# Patient Record
Sex: FEMALE | Race: WHITE | NOT HISPANIC OR LATINO | Employment: OTHER | URBAN - METROPOLITAN AREA
[De-identification: names, ages, dates, MRNs, and addresses within clinical notes are randomized per-mention and may not be internally consistent; named-entity substitution may affect disease eponyms.]

---

## 2019-06-18 ENCOUNTER — HISTORICAL (OUTPATIENT)
Dept: ADMINISTRATIVE | Facility: HOSPITAL | Age: 69
End: 2019-06-18

## 2019-11-08 ENCOUNTER — HISTORICAL (OUTPATIENT)
Dept: ADMINISTRATIVE | Facility: HOSPITAL | Age: 69
End: 2019-11-08

## 2019-12-06 ENCOUNTER — HISTORICAL (OUTPATIENT)
Dept: ADMINISTRATIVE | Facility: HOSPITAL | Age: 69
End: 2019-12-06

## 2019-12-07 LAB — GRAM STN SPEC: NORMAL

## 2019-12-09 ENCOUNTER — HISTORICAL (OUTPATIENT)
Dept: ADMINISTRATIVE | Facility: HOSPITAL | Age: 69
End: 2019-12-09

## 2019-12-09 LAB — FINAL CULTURE: NORMAL

## 2019-12-11 LAB — FINAL CULTURE: NORMAL

## 2020-01-14 ENCOUNTER — HISTORICAL (OUTPATIENT)
Dept: ADMINISTRATIVE | Facility: HOSPITAL | Age: 70
End: 2020-01-14

## 2020-01-15 LAB — GRAM STN SPEC: NORMAL

## 2020-01-17 LAB — FINAL CULTURE: NORMAL

## 2020-01-19 LAB — FINAL CULTURE: NORMAL

## 2020-02-04 ENCOUNTER — HISTORICAL (OUTPATIENT)
Dept: ADMINISTRATIVE | Facility: HOSPITAL | Age: 70
End: 2020-02-04

## 2022-04-30 NOTE — OP NOTE
DATE OF SURGERY:        SURGEON:  Yared Salmon MD    PREOPERATIVE DIAGNOSIS:  Acute endophthalmitis to the left eye.    POSTOPERATIVE DIAGNOSIS:  Acute endophthalmitis to the left eye.    PROCEDURE:  Vitreous biopsy and injection of antibiotics and steroids to the left eye.    ANESTHESIA:  General.    ESTIMATED BLOOD LOSS:  Less than 5 cc.    COMPLICATIONS:  None.    PROCEDURE INDICATIONS:  Ms. Lacy has a history of glaucoma surgery with a glaucoma tube shunt and has had to have a wound revision because of an exposed tube.  In the postoperative period, she has developed pain, decreasing vision, and fibrin in the anterior chamber indicative of an acute postoperative endophthalmitis.  She requires a vitreous sampling and injection of antibiotics. This is done in lieu of a vitrectomy as she has a very cloudy cornea and a very poor view of the retina.    DESCRIPTION OF PROCEDURE:  The patient was taken to the operative theater where general anesthesia was begun.  The left eye was prepped and draped in the normal sterile fashion and a lid speculum was applied.  A 3 cc syringe with a 25-gauge needle was used to perform a vitreous biopsy through the nasal pars plana 3.5 mm from the surgical limbus. 0.4 cc of vitreous sample was removed and sent for Gram stain and culture.  This was followed by 3 separate individual intravitreal injections.  The 1st was 4 mg of Decadron in 0.1 cc of fluid, followed by 2.25 mg of ceftazidime in 0.1 cc of fluid, followed by 1.0 mg of vancomycin in 0.1 cc of fluid.  Each were given through the nasal pars plana 3.5 mm from the surgical limbus with separate tuberculin syringes and separate 30-gauge needles. 0.4 cc of each antibiotic was then given in the subconjunctival space inferiorly.  All instruments were removed from the eye and several drops of TobraDex ophthalmic solution were applied to the eye.  The lid speculum and eye drape were then removed and the eye was covered with a  gauze patch.  The patient was transported to the postoperative care unit to recover.    DISCHARGE CONDITION:  Good.      DISPOSITION:  Home with followup with Dr. Mcgrath the following day.  This patient tolerated the procedure well.        ______________________________  MD YANY Bustamante/SK  DD:  12/06/2019  Time:  02:21PM  DT:  12/06/2019  Time:  02:34PM  Job #:  [id number]

## 2022-04-30 NOTE — OP NOTE
DATE OF SURGERY:        SURGEON:  Yared Salmon MD    PREOPERATIVE DIAGNOSIS:  Acute endophthalmitis to the left eye.    POSTOPERATIVE DIAGNOSIS:  Acute endophthalmitis to the left eye.    PROCEDURES:  Pars plana vitrectomy with injection of antibiotics to the left eye.    ANESTHESIA:  General.    ESTIMATED BLOOD LOSS:  Less than 5 cc.    COMPLICATIONS:  None.    PROCEDURE INDICATIONS:  Ms. Lacy has a history of acute endophthalmitis to the left eye with a history of a glaucoma tube shunt and a wound revision.  Three days ago, she had a vitreous tap and injection of vancomycin and ceftazidime.  There was no growth at 72 hours.  However, the antibiotics half life is 72 hours and she still has fibrin in the anterior chamber, vitreitis and hand motions vision, so she requires a vitrectomy with repeat injection of antibiotics.    DESCRIPTION OF PROCEDURE:  The patient was taken to the operative theater where general anesthesia was begun.  The left eye was prepped and draped in the normal sterile fashion and a lid speculum was applied.  An MVR blade was used to create a corneal paracentesis, and the anterior chamber was inflated with Healon improving the view slightly.  The patient still had corneal edema and Descemet's folds however.       Three 25-gauge trocars were placed in the globe, 3.5 mm from the surgical limbus.  An infusion cannula was placed, as well as the light pipe and a vitrectomy cutter.  The vitrectomy cutter could be visualized in mid vitreous and a central floor vitrectomy was performed.  As the vitreous was removed, the posterior pole view slightly improved.  The corneal epithelium was removed using a Koi blade from the pupillary margins allowing a better view of the retina.       The vitreous was removed down to the retina.  It was apparent that there was no posterior vitreous detachment and there was fibrinoid posterior hyaloid over the macular surface.  A cursory attempt to create a  posterior vitreous detachment was attempted.  However, this was not working and to create a posterior vitreous detachment was going to require a closer approach to the retinal surface with the vitrectomy cutter.     As the view was poor, it was decided to leave the posterior hyaloid attached.  The vitrectomy cutter was then used to remove the Healon from the anterior chamber and hopefully any fibrin, although the view of the anterior chamber was still cloudy from the cornea.  All instruments were removed from the eye.       Intravitreal injections of 0.1 cc of vancomycin in a concentration of 1.0 mg per 0.1 cc, followed by 2.25 mg of ceftazidime in 0.1 cc and 4 mg of Decadron in 0.1 cc were all given intravitreally through separate tuberculin syringes without complication.  0.5 cc of each fluid was also given in the subconjunctival space.       Several drops of TobraDex ophthalmic solution were applied to the eye.  The postoperative intraocular pressure was 15 mmHg.  The lid speculum and eye drape were then removed, and the eye was covered with a gauze patch and a Quijano shield.  The patient was then transported to the postoperative care unit to recover.    DISCHARGE CONDITION:  Good.    DISPOSITION:  Home with followup with Dr. Salmon the following day.  This patient tolerated the procedure well.        ______________________________  MD YANY Bustamante/UM  DD:  12/09/2019  Time:  11:01AM  DT:  12/09/2019  Time:  11:18AM  Job #:  [id number]

## 2022-04-30 NOTE — OP NOTE
DATE OF SURGERY:  6-18-19    SURGEON:  Lyssa Mcgrath MD    PREOPERATIVE DIAGNOSIS:  Open angle glaucoma left eye; visually significant cataract left eye    POSTOPERATIVE DIAGNOSIS:  same status post procedure    PROCEDURE:  Ahmed placement in the left eye with scleral patch graft reinforcement of the sclera left eye.; phacoemulsification left eye with intraocular lens    ANESTHESIA:  MAC plus local.   COMPLICATIONS:  None.   ESTIMATED BLOOD LOSS:  Less than 1 cc.    INDICATIONS:  The patient was evaluated in clinic and noted to have an elevated intraocular pressure on maximum medical therapy.    The advantages, risks and benefits of surgical intervention were discussed with the patient including but not limited to bleeding, infection, decreased vision, loss of the eye and need for subsequent surgery as well as worsening of cataract and diplopia.  The patient acknowledged understanding and wished to proceed.  Informed consent was obtained from the patient in clinic.    PROCEDURE IN DETAIL:  The patient was brought to the operating room and laid supine postion. MAC anesthesia undertaken without complication.  The operative eye and periorbital region were prepped and draped in the usual manner for ocular surgery.  A paracentesis incision was made at approximately 4 o'clock with a clear cornea at the limbus. Preservative free Lidocaine and epinephrine was injected into the anterior chamber followed by viscoelastic and vision blue.  A triplanar cataract wound was then created approximately 2 o'clock through clear cornea at the limbus.  Curvilinear capsulorrhexis was created without complication.     BSS sonic cannula was used to hydrodissect the lens.  The lens was found to move freely within the capsular bag.  Lens was then removed in divide and conquer technique.  Remaining cortical material was removed with I&A handpiece.  A 20.0 diopter ZCBOO lens was then implanted into the capsular bag. The remaining  viscoelastic was then removed with the irrigation aspiration handpiece. The wounds were hydrated and a 10.0 nylon placed at the wound.  A 7-0 Vicryl traction suture was placed in the cornea superiorly and the eye was moved inferiorly for access to the surgical site.  A small peritomy was made at 12 o'clock and injection of preservative free Lidocaine/Epinephrine mixture was injected into sub-Tenon's space for further anesthesia as well as dissection.  The peritomy was then continued for two clock hours using Medhat scissors and undermined.  Wing relaxing incisions were made temporally.  The sub-Tenon space was expanded with blunt tipped Medhat scissors.  Hemostasis was maintained using bipolar cautery.  The Ahmed valve was primed with BSS and then placed into sub-Tenon space  A caliper was used to position the valve precisely 8 mm posterior to the limbus in superotemporal position.  Two 10-0 nylon sutures were used to secure it to the sclera.  The tube was cut 3 mm long for insertion to the anterior chamber.  23 gauge needle was used to make the incision to the anterior chamber parallel to the iris.  The tube was inserted and noted to be in position in front of the iris and away from the cornea.  The tube was secured to the sclera using a 10-0 nylon suture.  The tube was covered with scleral patch graft which was secured to the sclerae using 10-0 nylon suture .  The conjunctiva was then closed using interrupted and mattress 10-0 nylon sutures.  The IOP was judged to be physiologic.  The tube remains in good position in the anterior chamber.  Postop injections of Ancef and Dexamethasone were injected  subconjunctivally.  The lid speculum was removed.  Maxitrol ointment was placed on the operative eye followed by pressure patch and shield.  The patient left the operating room in stable condition having tolerated the procedure well.

## 2023-07-27 DIAGNOSIS — Z95.2 S/P AVR: Primary | ICD-10-CM

## 2023-08-10 ENCOUNTER — OFFICE VISIT (OUTPATIENT)
Dept: CARDIAC SURGERY | Facility: CLINIC | Age: 73
End: 2023-08-10
Payer: MEDICARE

## 2023-08-10 ENCOUNTER — LAB VISIT (OUTPATIENT)
Dept: LAB | Facility: HOSPITAL | Age: 73
End: 2023-08-10
Attending: THORACIC SURGERY (CARDIOTHORACIC VASCULAR SURGERY)
Payer: MEDICARE

## 2023-08-10 VITALS
DIASTOLIC BLOOD PRESSURE: 69 MMHG | HEIGHT: 66 IN | HEART RATE: 78 BPM | RESPIRATION RATE: 18 BRPM | WEIGHT: 205 LBS | BODY MASS INDEX: 32.95 KG/M2 | SYSTOLIC BLOOD PRESSURE: 110 MMHG | OXYGEN SATURATION: 98 %

## 2023-08-10 DIAGNOSIS — Z79.4 DIABETES MELLITUS DUE TO UNDERLYING CONDITION WITH DIABETIC NEPHROPATHY, WITH LONG-TERM CURRENT USE OF INSULIN: ICD-10-CM

## 2023-08-10 DIAGNOSIS — Z95.2 S/P AVR: ICD-10-CM

## 2023-08-10 DIAGNOSIS — E08.21 DIABETES MELLITUS DUE TO UNDERLYING CONDITION WITH DIABETIC NEPHROPATHY, WITH LONG-TERM CURRENT USE OF INSULIN: ICD-10-CM

## 2023-08-10 DIAGNOSIS — I35.0 NONRHEUMATIC AORTIC VALVE STENOSIS: ICD-10-CM

## 2023-08-10 DIAGNOSIS — I35.0 NONRHEUMATIC AORTIC VALVE STENOSIS: Primary | ICD-10-CM

## 2023-08-10 LAB
ALBUMIN SERPL-MCNC: 3.8 G/DL (ref 3.4–4.8)
ALBUMIN/GLOB SERPL: 1.7 RATIO (ref 1.1–2)
ALP SERPL-CCNC: 60 UNIT/L (ref 40–150)
ALT SERPL-CCNC: 11 UNIT/L (ref 0–55)
APPEARANCE UR: CLEAR
APTT PPP: 39.5 SECONDS (ref 23.2–33.7)
AST SERPL-CCNC: 18 UNIT/L (ref 5–34)
BACTERIA #/AREA URNS AUTO: ABNORMAL /HPF
BASOPHILS # BLD AUTO: 0.07 X10(3)/MCL
BASOPHILS NFR BLD AUTO: 1 %
BILIRUB SERPL-MCNC: 0.6 MG/DL
BILIRUB UR QL STRIP.AUTO: NEGATIVE
BUN SERPL-MCNC: 46.4 MG/DL (ref 9.8–20.1)
CALCIUM SERPL-MCNC: 8.9 MG/DL (ref 8.4–10.2)
CHLORIDE SERPL-SCNC: 112 MMOL/L (ref 98–107)
CO2 SERPL-SCNC: 22 MMOL/L (ref 23–31)
COLOR UR: YELLOW
CREAT SERPL-MCNC: 1.39 MG/DL (ref 0.55–1.02)
EOSINOPHIL # BLD AUTO: 0.13 X10(3)/MCL (ref 0–0.9)
EOSINOPHIL NFR BLD AUTO: 1.8 %
ERYTHROCYTE [DISTWIDTH] IN BLOOD BY AUTOMATED COUNT: 14.7 % (ref 11.5–17)
GFR SERPLBLD CREATININE-BSD FMLA CKD-EPI: 40 MLS/MIN/1.73/M2
GLOBULIN SER-MCNC: 2.3 GM/DL (ref 2.4–3.5)
GLUCOSE SERPL-MCNC: 197 MG/DL (ref 82–115)
GLUCOSE UR QL STRIP.AUTO: ABNORMAL
GROUP & RH: NORMAL
HCT VFR BLD AUTO: 41.2 % (ref 37–47)
HGB BLD-MCNC: 12.2 G/DL (ref 12–16)
IMM GRANULOCYTES # BLD AUTO: 0.06 X10(3)/MCL (ref 0–0.04)
IMM GRANULOCYTES NFR BLD AUTO: 0.8 %
INDIRECT COOMBS GEL: NORMAL
KETONES UR QL STRIP.AUTO: NEGATIVE
LEUKOCYTE ESTERASE UR QL STRIP.AUTO: ABNORMAL
LYMPHOCYTES # BLD AUTO: 1.46 X10(3)/MCL (ref 0.6–4.6)
LYMPHOCYTES NFR BLD AUTO: 20.7 %
MCH RBC QN AUTO: 28.4 PG (ref 27–31)
MCHC RBC AUTO-ENTMCNC: 29.6 G/DL (ref 33–36)
MCV RBC AUTO: 96 FL (ref 80–94)
MONOCYTES # BLD AUTO: 0.67 X10(3)/MCL (ref 0.1–1.3)
MONOCYTES NFR BLD AUTO: 9.5 %
NEUTROPHILS # BLD AUTO: 4.68 X10(3)/MCL (ref 2.1–9.2)
NEUTROPHILS NFR BLD AUTO: 66.2 %
NITRITE UR QL STRIP.AUTO: POSITIVE
NRBC BLD AUTO-RTO: 0 %
PH UR STRIP.AUTO: 5.5 [PH]
PLATELET # BLD AUTO: 166 X10(3)/MCL (ref 130–400)
PMV BLD AUTO: 9.8 FL (ref 7.4–10.4)
POTASSIUM SERPL-SCNC: 5.2 MMOL/L (ref 3.5–5.1)
PROT SERPL-MCNC: 6.1 GM/DL (ref 5.8–7.6)
PROT UR QL STRIP.AUTO: NEGATIVE
RBC # BLD AUTO: 4.29 X10(6)/MCL (ref 4.2–5.4)
RBC #/AREA URNS AUTO: <5 /HPF
RBC UR QL AUTO: NEGATIVE
SODIUM SERPL-SCNC: 141 MMOL/L (ref 136–145)
SP GR UR STRIP.AUTO: 1.02 (ref 1–1.03)
SPECIMEN OUTDATE: NORMAL
SQUAMOUS #/AREA URNS AUTO: <5 /HPF
UROBILINOGEN UR STRIP-ACNC: 0.2
WBC # SPEC AUTO: 7.07 X10(3)/MCL (ref 4.5–11.5)
WBC #/AREA URNS AUTO: 7 /HPF

## 2023-08-10 PROCEDURE — 86900 BLOOD TYPING SEROLOGIC ABO: CPT | Performed by: THORACIC SURGERY (CARDIOTHORACIC VASCULAR SURGERY)

## 2023-08-10 PROCEDURE — 3074F SYST BP LT 130 MM HG: CPT | Mod: CPTII,,, | Performed by: THORACIC SURGERY (CARDIOTHORACIC VASCULAR SURGERY)

## 2023-08-10 PROCEDURE — 1101F PR PT FALLS ASSESS DOC 0-1 FALLS W/OUT INJ PAST YR: ICD-10-PCS | Mod: CPTII,,, | Performed by: THORACIC SURGERY (CARDIOTHORACIC VASCULAR SURGERY)

## 2023-08-10 PROCEDURE — 4010F PR ACE/ARB THEARPY RXD/TAKEN: ICD-10-PCS | Mod: CPTII,,, | Performed by: THORACIC SURGERY (CARDIOTHORACIC VASCULAR SURGERY)

## 2023-08-10 PROCEDURE — 93005 ELECTROCARDIOGRAM TRACING: CPT

## 2023-08-10 PROCEDURE — 81001 URINALYSIS AUTO W/SCOPE: CPT

## 2023-08-10 PROCEDURE — 3288F PR FALLS RISK ASSESSMENT DOCUMENTED: ICD-10-PCS | Mod: CPTII,,, | Performed by: THORACIC SURGERY (CARDIOTHORACIC VASCULAR SURGERY)

## 2023-08-10 PROCEDURE — 85025 COMPLETE CBC W/AUTO DIFF WBC: CPT

## 2023-08-10 PROCEDURE — 3074F PR MOST RECENT SYSTOLIC BLOOD PRESSURE < 130 MM HG: ICD-10-PCS | Mod: CPTII,,, | Performed by: THORACIC SURGERY (CARDIOTHORACIC VASCULAR SURGERY)

## 2023-08-10 PROCEDURE — 85730 THROMBOPLASTIN TIME PARTIAL: CPT

## 2023-08-10 PROCEDURE — 3288F FALL RISK ASSESSMENT DOCD: CPT | Mod: CPTII,,, | Performed by: THORACIC SURGERY (CARDIOTHORACIC VASCULAR SURGERY)

## 2023-08-10 PROCEDURE — 80053 COMPREHEN METABOLIC PANEL: CPT

## 2023-08-10 PROCEDURE — 99203 OFFICE O/P NEW LOW 30 MIN: CPT | Mod: ,,, | Performed by: THORACIC SURGERY (CARDIOTHORACIC VASCULAR SURGERY)

## 2023-08-10 PROCEDURE — 36415 COLL VENOUS BLD VENIPUNCTURE: CPT

## 2023-08-10 PROCEDURE — 1101F PT FALLS ASSESS-DOCD LE1/YR: CPT | Mod: CPTII,,, | Performed by: THORACIC SURGERY (CARDIOTHORACIC VASCULAR SURGERY)

## 2023-08-10 PROCEDURE — 4010F ACE/ARB THERAPY RXD/TAKEN: CPT | Mod: CPTII,,, | Performed by: THORACIC SURGERY (CARDIOTHORACIC VASCULAR SURGERY)

## 2023-08-10 PROCEDURE — 1159F MED LIST DOCD IN RCRD: CPT | Mod: CPTII,,, | Performed by: THORACIC SURGERY (CARDIOTHORACIC VASCULAR SURGERY)

## 2023-08-10 PROCEDURE — 99203 PR OFFICE/OUTPT VISIT, NEW, LEVL III, 30-44 MIN: ICD-10-PCS | Mod: ,,, | Performed by: THORACIC SURGERY (CARDIOTHORACIC VASCULAR SURGERY)

## 2023-08-10 PROCEDURE — 3078F PR MOST RECENT DIASTOLIC BLOOD PRESSURE < 80 MM HG: ICD-10-PCS | Mod: CPTII,,, | Performed by: THORACIC SURGERY (CARDIOTHORACIC VASCULAR SURGERY)

## 2023-08-10 PROCEDURE — 3078F DIAST BP <80 MM HG: CPT | Mod: CPTII,,, | Performed by: THORACIC SURGERY (CARDIOTHORACIC VASCULAR SURGERY)

## 2023-08-10 PROCEDURE — 1159F PR MEDICATION LIST DOCUMENTED IN MEDICAL RECORD: ICD-10-PCS | Mod: CPTII,,, | Performed by: THORACIC SURGERY (CARDIOTHORACIC VASCULAR SURGERY)

## 2023-08-10 RX ORDER — FLUTICASONE PROPIONATE 50 MCG
SPRAY, SUSPENSION (ML) NASAL
COMMUNITY
Start: 2023-05-05

## 2023-08-10 RX ORDER — CHOLESTYRAMINE 4 G/9G
4 POWDER, FOR SUSPENSION ORAL
COMMUNITY
Start: 2023-07-14

## 2023-08-10 RX ORDER — FUROSEMIDE 40 MG/1
40 TABLET ORAL DAILY
COMMUNITY
Start: 2023-08-08

## 2023-08-10 RX ORDER — ROPINIROLE 1 MG/1
1 TABLET, FILM COATED ORAL NIGHTLY
COMMUNITY
Start: 2023-07-14

## 2023-08-10 RX ORDER — AZITHROMYCIN 250 MG/1
250 TABLET, FILM COATED ORAL
Status: ON HOLD | COMMUNITY
Start: 2023-02-28 | End: 2023-08-31 | Stop reason: HOSPADM

## 2023-08-10 RX ORDER — AZELASTINE 1 MG/ML
SPRAY, METERED NASAL
COMMUNITY
Start: 2023-05-05

## 2023-08-10 RX ORDER — HYDROXYZINE HYDROCHLORIDE 25 MG/1
25 TABLET, FILM COATED ORAL NIGHTLY
COMMUNITY
Start: 2023-07-14

## 2023-08-10 RX ORDER — DILTIAZEM HYDROCHLORIDE 120 MG/1
120 CAPSULE, COATED, EXTENDED RELEASE ORAL DAILY
Status: ON HOLD | COMMUNITY
Start: 2023-07-05 | End: 2023-08-31 | Stop reason: HOSPADM

## 2023-08-10 RX ORDER — EMPAGLIFLOZIN 25 MG/1
25 TABLET, FILM COATED ORAL DAILY
COMMUNITY
Start: 2023-07-14

## 2023-08-10 RX ORDER — INSULIN GLARGINE 100 [IU]/ML
30 INJECTION, SOLUTION SUBCUTANEOUS NIGHTLY
COMMUNITY
Start: 2023-07-14

## 2023-08-10 RX ORDER — CLOPIDOGREL BISULFATE 75 MG/1
75 TABLET ORAL DAILY
COMMUNITY
Start: 2023-07-14

## 2023-08-10 RX ORDER — LOSARTAN POTASSIUM 50 MG/1
50 TABLET ORAL DAILY
COMMUNITY
Start: 2023-06-02

## 2023-08-10 RX ORDER — ERYTHROMYCIN 5 MG/G
OINTMENT OPHTHALMIC
COMMUNITY
Start: 2023-06-28

## 2023-08-10 RX ORDER — METOPROLOL SUCCINATE 50 MG/1
50 TABLET, EXTENDED RELEASE ORAL DAILY
COMMUNITY
Start: 2023-08-08

## 2023-08-10 RX ORDER — PANTOPRAZOLE SODIUM 40 MG/1
40 TABLET, DELAYED RELEASE ORAL DAILY
COMMUNITY
Start: 2023-07-14

## 2023-08-10 RX ORDER — GABAPENTIN 300 MG/1
300 CAPSULE ORAL 3 TIMES DAILY
COMMUNITY
Start: 2023-07-14

## 2023-08-10 RX ORDER — BRIMONIDINE TARTRATE 2 MG/ML
1 SOLUTION/ DROPS OPHTHALMIC DAILY PRN
COMMUNITY
Start: 2023-03-16

## 2023-08-10 RX ORDER — APIXABAN 5 MG/1
5 TABLET, FILM COATED ORAL 2 TIMES DAILY
COMMUNITY
Start: 2023-07-14

## 2023-08-10 RX ORDER — PEN NEEDLE, DIABETIC 31 GX3/16"
NEEDLE, DISPOSABLE MISCELLANEOUS
COMMUNITY
Start: 2023-05-05

## 2023-08-10 RX ORDER — LANOLIN ALCOHOL/MO/W.PET/CERES
400 CREAM (GRAM) TOPICAL DAILY
COMMUNITY

## 2023-08-10 RX ORDER — METOPROLOL SUCCINATE 25 MG/1
25 TABLET, EXTENDED RELEASE ORAL
Status: ON HOLD | COMMUNITY
Start: 2023-07-14 | End: 2023-08-31 | Stop reason: HOSPADM

## 2023-08-10 RX ORDER — ATORVASTATIN CALCIUM 40 MG/1
40 TABLET, FILM COATED ORAL DAILY
COMMUNITY
Start: 2023-07-14

## 2023-08-10 RX ORDER — METHAZOLAMIDE 25 MG/1
50 TABLET ORAL 2 TIMES DAILY
COMMUNITY
Start: 2023-07-07

## 2023-08-10 NOTE — PROGRESS NOTES
History and Physical      Subjective:      Patient ID: Bria Lacy is a 73 y.o. female.    Chief Complaint: Aortic Stenosis (Referral [facility]-TAVR-scheduled 8/28/23)      HPI:  This is a 73-year-old female diabetic who was recently admitted with an episode of acute systolic congestive heart failure and atrial fibrillation with rapid ventricular response she was treated aggressively with rate control and aggressive diuresis.  She was diagnosed at that time with critical aortic stenosis.  Her STS risk assessment is 2.3%.  The ejection fraction is 60%.  The aortic valve area is 0.6 cm with a mean gradient of 40 and a peak velocity of 4.1 m/sec.  The patient would be a very good candidate for transcatheter solution.  The procedure, risks, and complications were discussed in detail and plain language.  Included in the discussion was the risk of bleeding, infection, myocardial infarction, stroke, and the potential for pacemaker placement and open conversion.  The patient voiced understanding and is willing to proceed.  Stop Eliquis 4 days prior to surgery      Current Outpatient Medications:     atorvastatin (LIPITOR) 40 MG tablet, Take 40 mg by mouth., Disp: , Rfl:     azelastine (ASTELIN) 137 mcg (0.1 %) nasal spray, SMARTSIG:Both Nares, Disp: , Rfl:     azithromycin (Z-JUDY) 250 MG tablet, Take 250 mg by mouth., Disp: , Rfl:     brimonidine 0.2% (ALPHAGAN) 0.2 % Drop, Place into both eyes., Disp: , Rfl:     cholestyramine (QUESTRAN) 4 gram packet, Take by mouth., Disp: , Rfl:     clopidogreL (PLAVIX) 75 mg tablet, Take 75 mg by mouth., Disp: , Rfl:     diltiaZEM (CARDIZEM CD) 120 MG Cp24, Take 120 mg by mouth., Disp: , Rfl:     ELIQUIS 5 mg Tab, Take 5 mg by mouth 2 (two) times daily., Disp: , Rfl:     erythromycin (ROMYCIN) ophthalmic ointment, Place into both eyes., Disp: , Rfl:     fluticasone propionate (FLONASE) 50 mcg/actuation nasal spray, by Each Nostril route., Disp: , Rfl:     furosemide (LASIX) 40 MG tablet,  "Take 40 mg by mouth., Disp: , Rfl:     gabapentin (NEURONTIN) 300 MG capsule, Take by mouth., Disp: , Rfl:     hydrOXYzine HCL (ATARAX) 25 MG tablet, Take 25 mg by mouth., Disp: , Rfl:     JARDIANCE 25 mg tablet, Take 25 mg by mouth., Disp: , Rfl:     LANTUS SOLOSTAR U-100 INSULIN glargine 100 units/mL SubQ pen, Inject into the skin., Disp: , Rfl:     losartan (COZAAR) 50 MG tablet, Take 50 mg by mouth., Disp: , Rfl:     methazoLAMIDE (NEPTAZANE) 25 mg tablet, Take by mouth 2 (two) times daily., Disp: , Rfl:     metoprolol succinate (TOPROL-XL) 25 MG 24 hr tablet, Take 25 mg by mouth., Disp: , Rfl:     metoprolol succinate (TOPROL-XL) 50 MG 24 hr tablet, Take 50 mg by mouth., Disp: , Rfl:     pantoprazole (PROTONIX) 40 MG tablet, Take 40 mg by mouth., Disp: , Rfl:     rOPINIRole (REQUIP) 1 MG tablet, Take 1 mg by mouth every evening., Disp: , Rfl:     SURE COMFORT PEN NEEDLE 31 gauge x 5/16" Ndle, , Disp: , Rfl:   Review of patient's allergies indicates:  No Known Allergies    /69 (BP Location: Left arm)   Pulse 78   Resp 18   Ht 5' 6" (1.676 m)   Wt 93 kg (205 lb)   SpO2 98%   BMI 33.09 kg/m²     Review of Systems   Constitutional: Negative.   HENT: Negative.    Eyes: Negative.    Cardiovascular: Negative.    Respiratory: Negative.    Endocrine: Negative.    Hematologic/Lymphatic: Negative.    Skin: Negative.    Musculoskeletal: Negative.    Gastrointestinal: Negative.    Genitourinary: Negative.    Neurological: Negative.    Psychiatric/Behavioral: Negative.    Allergic/Immunologic: Negative.        Objective:     Constitutional:  No acute distress  HENT:  Supple without mass.  Trachea midline.  No carotid bruits  Eyes:  Blind in the left eye  Cardiovascular:  Irregularly irregular.  There is a grade 2 systolic ejection murmur  Respiratory:  Clear to auscultation  Endocrine:  Hematologic/Lymphatic:   Skin:  Warm and dry  Musculoskeletal:  No cyanosis clubbing or edema  Gastrointestinal:  Soft and " "nontender  Genitourinary:   Neurological:  Normal  Psychiatric/Behavioral:   Extremities:  There is a Dupuytren's contracture of the right middle finger    Assessment:  Severe aortic stenosis     Imaging:       Plan:  Transcatheter aortic valve replacement on August 28.  Stop Eliquis 4 days prior         History and Physical      Subjective:      Patient ID: Bria Lacy is a 73 y.o. female.    Chief Complaint: Aortic Stenosis (Referral [facility]-TAVR-scheduled 8/28/23)      HPI:     No current outpatient medications on file.  Review of patient's allergies indicates:  Not on File    /69 (BP Location: Left arm)   Pulse 78   Resp 18   Ht 5' 6" (1.676 m)   Wt 93 kg (205 lb)   SpO2 98%   BMI 33.09 kg/m²     Review of Systems   Constitutional: Negative.   HENT: Negative.    Eyes: Negative.    Cardiovascular: Negative.    Respiratory: Negative.    Endocrine: Negative.    Hematologic/Lymphatic: Negative.    Skin: Negative.    Musculoskeletal: Negative.    Gastrointestinal: Negative.    Genitourinary: Negative.    Neurological: Negative.    Psychiatric/Behavioral: Negative.    Allergic/Immunologic: Negative.        Objective:     Constitutional:   HENT:    Eyes:   Cardiovascular:   Respiratory:   Endocrine:  Hematologic/Lymphatic:   Skin:  Musculoskeletal:   Gastrointestinal:   Genitourinary:   Neurological:   Psychiatric/Behavioral:   Extremities:     Assessment:     Imaging:       Plan:           "

## 2023-08-22 RX ORDER — MAGNESIUM 250 MG
1 TABLET ORAL DAILY
Status: ON HOLD | COMMUNITY
End: 2023-08-31 | Stop reason: HOSPADM

## 2023-08-22 RX ORDER — CHOLECALCIFEROL (VITAMIN D3) 125 MCG
5000 CAPSULE ORAL DAILY
COMMUNITY

## 2023-08-22 RX ORDER — MULTIVITAMIN
1 TABLET ORAL DAILY
COMMUNITY

## 2023-08-22 RX ORDER — ACETAMINOPHEN 160 MG/5ML
200 SUSPENSION, ORAL (FINAL DOSE FORM) ORAL DAILY
COMMUNITY

## 2023-08-22 RX ORDER — DORZOLAMIDE HCL 20 MG/ML
1 SOLUTION/ DROPS OPHTHALMIC 2 TIMES DAILY
COMMUNITY

## 2023-08-23 ENCOUNTER — ANESTHESIA EVENT (OUTPATIENT)
Dept: CARDIOLOGY | Facility: HOSPITAL | Age: 73
DRG: 267 | End: 2023-08-23
Payer: MEDICARE

## 2023-08-28 ENCOUNTER — ANESTHESIA (OUTPATIENT)
Dept: CARDIOLOGY | Facility: HOSPITAL | Age: 73
DRG: 267 | End: 2023-08-28
Payer: MEDICARE

## 2023-08-28 ENCOUNTER — HOSPITAL ENCOUNTER (INPATIENT)
Facility: HOSPITAL | Age: 73
LOS: 3 days | Discharge: HOME HEALTH | DRG: 267 | End: 2023-08-31
Attending: INTERNAL MEDICINE | Admitting: INTERNAL MEDICINE
Payer: MEDICARE

## 2023-08-28 DIAGNOSIS — I44.7 LBBB (LEFT BUNDLE BRANCH BLOCK): ICD-10-CM

## 2023-08-28 DIAGNOSIS — I82.409 DVT (DEEP VENOUS THROMBOSIS): ICD-10-CM

## 2023-08-28 DIAGNOSIS — I35.0 SEVERE AORTIC STENOSIS: ICD-10-CM

## 2023-08-28 DIAGNOSIS — I35.0 AORTIC VALVE STENOSIS: ICD-10-CM

## 2023-08-28 DIAGNOSIS — I35.0 AORTIC STENOSIS: ICD-10-CM

## 2023-08-28 DIAGNOSIS — R10.32 GROIN PAIN, LEFT: Primary | ICD-10-CM

## 2023-08-28 DIAGNOSIS — I25.10 CAD (CORONARY ARTERY DISEASE): ICD-10-CM

## 2023-08-28 LAB
ALBUMIN SERPL-MCNC: 4 G/DL (ref 3.4–4.8)
ALBUMIN/GLOB SERPL: 1.4 RATIO (ref 1.1–2)
ALP SERPL-CCNC: 61 UNIT/L (ref 40–150)
ALT SERPL-CCNC: 16 UNIT/L (ref 0–55)
AST SERPL-CCNC: 41 UNIT/L (ref 5–34)
AV MEAN GRADIENT: 4 MMHG
AV PEAK GRADIENT: 9 MMHG
BILIRUB SERPL-MCNC: 0.4 MG/DL
BSA FOR ECHO PROCEDURE: 2.08 M2
BUN SERPL-MCNC: 39.1 MG/DL (ref 9.8–20.1)
CALCIUM SERPL-MCNC: 8.9 MG/DL (ref 8.4–10.2)
CHLORIDE SERPL-SCNC: 116 MMOL/L (ref 98–107)
CO2 SERPL-SCNC: 16 MMOL/L (ref 23–31)
CREAT SERPL-MCNC: 1.39 MG/DL (ref 0.55–1.02)
DOP CALC AO PEAK VEL: 1.51 M/S
DOP CALC AO VTI: 30.9 CM
GFR SERPLBLD CREATININE-BSD FMLA CKD-EPI: 40 MLS/MIN/1.73/M2
GLOBULIN SER-MCNC: 2.8 GM/DL (ref 2.4–3.5)
GLUCOSE SERPL-MCNC: 159 MG/DL (ref 82–115)
GROUP & RH: NORMAL
INDIRECT COOMBS GEL: NORMAL
INR PPP: 1.1
MRSA PCR SCRN (OHS): NOT DETECTED
POC ACTIVATED CLOTTING TIME K: 131 SEC (ref 74–137)
POCT GLUCOSE: 147 MG/DL (ref 70–110)
POCT GLUCOSE: 157 MG/DL (ref 70–110)
POTASSIUM SERPL-SCNC: 5.4 MMOL/L (ref 3.5–5.1)
PROT SERPL-MCNC: 6.8 GM/DL (ref 5.8–7.6)
PROTHROMBIN TIME: 14.5 SECONDS (ref 12.5–14.5)
SAMPLE: NORMAL
SODIUM SERPL-SCNC: 143 MMOL/L (ref 136–145)
SPECIMEN OUTDATE: NORMAL

## 2023-08-28 PROCEDURE — 25000003 PHARM REV CODE 250: Performed by: NURSE ANESTHETIST, CERTIFIED REGISTERED

## 2023-08-28 PROCEDURE — D9220A PRA ANESTHESIA: Mod: Q0,CRNA,, | Performed by: NURSE ANESTHETIST, CERTIFIED REGISTERED

## 2023-08-28 PROCEDURE — 80053 COMPREHEN METABOLIC PANEL: CPT | Performed by: INTERNAL MEDICINE

## 2023-08-28 PROCEDURE — 33361 PR TAVR, PERCUTANEOUS FEMORAL: ICD-10-PCS | Mod: 62,Q0,, | Performed by: THORACIC SURGERY (CARDIOTHORACIC VASCULAR SURGERY)

## 2023-08-28 PROCEDURE — 93010 ELECTROCARDIOGRAM REPORT: CPT | Mod: ,,, | Performed by: STUDENT IN AN ORGANIZED HEALTH CARE EDUCATION/TRAINING PROGRAM

## 2023-08-28 PROCEDURE — 33361 REPLACE AORTIC VALVE PERQ: CPT | Mod: 62,Q0,, | Performed by: THORACIC SURGERY (CARDIOTHORACIC VASCULAR SURGERY)

## 2023-08-28 PROCEDURE — 93010 EKG 12-LEAD: ICD-10-PCS | Mod: ,,, | Performed by: STUDENT IN AN ORGANIZED HEALTH CARE EDUCATION/TRAINING PROGRAM

## 2023-08-28 PROCEDURE — 87641 MR-STAPH DNA AMP PROBE: CPT | Performed by: INTERNAL MEDICINE

## 2023-08-28 PROCEDURE — 63600175 PHARM REV CODE 636 W HCPCS: Performed by: INTERNAL MEDICINE

## 2023-08-28 PROCEDURE — D9220A PRA ANESTHESIA: ICD-10-PCS | Mod: Q0,CRNA,, | Performed by: NURSE ANESTHETIST, CERTIFIED REGISTERED

## 2023-08-28 PROCEDURE — 25000003 PHARM REV CODE 250: Performed by: INTERNAL MEDICINE

## 2023-08-28 PROCEDURE — 63600175 PHARM REV CODE 636 W HCPCS

## 2023-08-28 PROCEDURE — 21400001 HC TELEMETRY ROOM

## 2023-08-28 PROCEDURE — 36620 INSERTION CATHETER ARTERY: CPT | Performed by: ANESTHESIOLOGY

## 2023-08-28 PROCEDURE — C1883 ADAPT/EXT, PACING/NEURO LEAD: HCPCS | Performed by: INTERNAL MEDICINE

## 2023-08-28 PROCEDURE — 11000001 HC ACUTE MED/SURG PRIVATE ROOM

## 2023-08-28 PROCEDURE — 37000008 HC ANESTHESIA 1ST 15 MINUTES: Performed by: INTERNAL MEDICINE

## 2023-08-28 PROCEDURE — 33361 REPLACE AORTIC VALVE PERQ: CPT | Mod: Q0 | Performed by: INTERNAL MEDICINE

## 2023-08-28 PROCEDURE — C1751 CATH, INF, PER/CENT/MIDLINE: HCPCS | Performed by: INTERNAL MEDICINE

## 2023-08-28 PROCEDURE — 85610 PROTHROMBIN TIME: CPT | Performed by: INTERNAL MEDICINE

## 2023-08-28 PROCEDURE — 86900 BLOOD TYPING SEROLOGIC ABO: CPT | Performed by: INTERNAL MEDICINE

## 2023-08-28 PROCEDURE — 83880 ASSAY OF NATRIURETIC PEPTIDE: CPT | Performed by: INTERNAL MEDICINE

## 2023-08-28 PROCEDURE — D9220A PRA ANESTHESIA: ICD-10-PCS | Mod: Q0,ANES,, | Performed by: ANESTHESIOLOGY

## 2023-08-28 PROCEDURE — C1760 CLOSURE DEV, VASC: HCPCS | Performed by: INTERNAL MEDICINE

## 2023-08-28 PROCEDURE — 63600175 PHARM REV CODE 636 W HCPCS: Performed by: NURSE ANESTHETIST, CERTIFIED REGISTERED

## 2023-08-28 PROCEDURE — 63600175 PHARM REV CODE 636 W HCPCS: Performed by: ANESTHESIOLOGY

## 2023-08-28 PROCEDURE — 37000009 HC ANESTHESIA EA ADD 15 MINS: Performed by: INTERNAL MEDICINE

## 2023-08-28 PROCEDURE — 86923 COMPATIBILITY TEST ELECTRIC: CPT | Mod: 91 | Performed by: INTERNAL MEDICINE

## 2023-08-28 PROCEDURE — C1769 GUIDE WIRE: HCPCS | Performed by: INTERNAL MEDICINE

## 2023-08-28 PROCEDURE — D9220A PRA ANESTHESIA: Mod: Q0,ANES,, | Performed by: ANESTHESIOLOGY

## 2023-08-28 PROCEDURE — 27800903 OPTIME MED/SURG SUP & DEVICES OTHER IMPLANTS: Performed by: INTERNAL MEDICINE

## 2023-08-28 PROCEDURE — 25000003 PHARM REV CODE 250: Performed by: ANESTHESIOLOGY

## 2023-08-28 PROCEDURE — 25500020 PHARM REV CODE 255: Performed by: INTERNAL MEDICINE

## 2023-08-28 PROCEDURE — 36620 ARTERIAL: ICD-10-PCS | Mod: 59,,, | Performed by: ANESTHESIOLOGY

## 2023-08-28 PROCEDURE — 93005 ELECTROCARDIOGRAM TRACING: CPT

## 2023-08-28 PROCEDURE — C1894 INTRO/SHEATH, NON-LASER: HCPCS | Performed by: INTERNAL MEDICINE

## 2023-08-28 DEVICE — ANGIO-SEAL VIP VASCULAR CLOSURE DEVICE
Type: IMPLANTABLE DEVICE | Site: CORONARY | Status: FUNCTIONAL
Brand: ANGIO-SEAL

## 2023-08-28 DEVICE — VLV EVOLUTFX-29 COMM US
Type: IMPLANTABLE DEVICE | Site: CORONARY | Status: FUNCTIONAL
Brand: EVOLUT™ FX

## 2023-08-28 RX ORDER — PROTAMINE SULFATE 10 MG/ML
INJECTION, SOLUTION INTRAVENOUS
Status: DISCONTINUED | OUTPATIENT
Start: 2023-08-28 | End: 2023-08-28

## 2023-08-28 RX ORDER — MUPIROCIN 20 MG/G
OINTMENT TOPICAL DAILY
Status: DISCONTINUED | OUTPATIENT
Start: 2023-08-28 | End: 2023-08-28

## 2023-08-28 RX ORDER — LIDOCAINE HYDROCHLORIDE 10 MG/ML
INJECTION INFILTRATION; PERINEURAL
Status: DISCONTINUED | OUTPATIENT
Start: 2023-08-28 | End: 2023-08-28

## 2023-08-28 RX ORDER — ONDANSETRON 2 MG/ML
4 INJECTION INTRAMUSCULAR; INTRAVENOUS ONCE
Status: COMPLETED | OUTPATIENT
Start: 2023-08-28 | End: 2023-08-28

## 2023-08-28 RX ORDER — HYDROCODONE BITARTRATE AND ACETAMINOPHEN 5; 325 MG/1; MG/1
1 TABLET ORAL
Status: DISCONTINUED | OUTPATIENT
Start: 2023-08-28 | End: 2023-08-28 | Stop reason: HOSPADM

## 2023-08-28 RX ORDER — ACETAMINOPHEN 10 MG/ML
INJECTION, SOLUTION INTRAVENOUS
Status: COMPLETED
Start: 2023-08-28 | End: 2023-08-28

## 2023-08-28 RX ORDER — HYDROCODONE BITARTRATE AND ACETAMINOPHEN 5; 325 MG/1; MG/1
1 TABLET ORAL EVERY 4 HOURS PRN
Status: DISCONTINUED | OUTPATIENT
Start: 2023-08-28 | End: 2023-08-31 | Stop reason: HOSPADM

## 2023-08-28 RX ORDER — MIDAZOLAM HYDROCHLORIDE 1 MG/ML
INJECTION INTRAMUSCULAR; INTRAVENOUS
Status: DISCONTINUED | OUTPATIENT
Start: 2023-08-28 | End: 2023-08-28

## 2023-08-28 RX ORDER — ONDANSETRON 2 MG/ML
INJECTION INTRAMUSCULAR; INTRAVENOUS
Status: DISCONTINUED | OUTPATIENT
Start: 2023-08-28 | End: 2023-08-28

## 2023-08-28 RX ORDER — GLUCAGON 1 MG
1 KIT INJECTION
Status: DISCONTINUED | OUTPATIENT
Start: 2023-08-28 | End: 2023-08-31 | Stop reason: HOSPADM

## 2023-08-28 RX ORDER — PROPOFOL 10 MG/ML
VIAL (ML) INTRAVENOUS CONTINUOUS PRN
Status: DISCONTINUED | OUTPATIENT
Start: 2023-08-28 | End: 2023-08-28

## 2023-08-28 RX ORDER — ONDANSETRON 4 MG/1
8 TABLET, ORALLY DISINTEGRATING ORAL EVERY 8 HOURS PRN
Status: DISCONTINUED | OUTPATIENT
Start: 2023-08-28 | End: 2023-08-31 | Stop reason: HOSPADM

## 2023-08-28 RX ORDER — METOPROLOL SUCCINATE 50 MG/1
50 TABLET, EXTENDED RELEASE ORAL DAILY
Status: DISCONTINUED | OUTPATIENT
Start: 2023-08-28 | End: 2023-08-29

## 2023-08-28 RX ORDER — LOSARTAN POTASSIUM 50 MG/1
50 TABLET ORAL DAILY
Status: DISCONTINUED | OUTPATIENT
Start: 2023-08-28 | End: 2023-08-29

## 2023-08-28 RX ORDER — ACETAMINOPHEN 325 MG/1
650 TABLET ORAL EVERY 4 HOURS PRN
Status: DISCONTINUED | OUTPATIENT
Start: 2023-08-28 | End: 2023-08-31 | Stop reason: HOSPADM

## 2023-08-28 RX ORDER — INSULIN ASPART 100 [IU]/ML
0-10 INJECTION, SOLUTION INTRAVENOUS; SUBCUTANEOUS
Status: DISCONTINUED | OUTPATIENT
Start: 2023-08-28 | End: 2023-08-31 | Stop reason: HOSPADM

## 2023-08-28 RX ORDER — IBUPROFEN 200 MG
24 TABLET ORAL
Status: DISCONTINUED | OUTPATIENT
Start: 2023-08-28 | End: 2023-08-31 | Stop reason: HOSPADM

## 2023-08-28 RX ORDER — HEPARIN SODIUM 1000 [USP'U]/ML
INJECTION, SOLUTION INTRAVENOUS; SUBCUTANEOUS
Status: DISCONTINUED | OUTPATIENT
Start: 2023-08-28 | End: 2023-08-28

## 2023-08-28 RX ORDER — ROPINIROLE 1 MG/1
1 TABLET, FILM COATED ORAL NIGHTLY
Status: DISCONTINUED | OUTPATIENT
Start: 2023-08-28 | End: 2023-08-31 | Stop reason: HOSPADM

## 2023-08-28 RX ORDER — FAMOTIDINE 10 MG/ML
20 INJECTION INTRAVENOUS ONCE
Status: CANCELLED | OUTPATIENT
Start: 2023-08-28

## 2023-08-28 RX ORDER — LIDOCAINE HYDROCHLORIDE 10 MG/ML
1 INJECTION, SOLUTION EPIDURAL; INFILTRATION; INTRACAUDAL; PERINEURAL ONCE
Status: CANCELLED | OUTPATIENT
Start: 2023-08-28 | End: 2023-08-28

## 2023-08-28 RX ORDER — FENTANYL CITRATE 50 UG/ML
INJECTION, SOLUTION INTRAMUSCULAR; INTRAVENOUS
Status: DISCONTINUED | OUTPATIENT
Start: 2023-08-28 | End: 2023-08-28

## 2023-08-28 RX ORDER — ATORVASTATIN CALCIUM 40 MG/1
40 TABLET, FILM COATED ORAL DAILY
Status: DISCONTINUED | OUTPATIENT
Start: 2023-08-28 | End: 2023-08-31 | Stop reason: HOSPADM

## 2023-08-28 RX ORDER — IBUPROFEN 200 MG
16 TABLET ORAL
Status: DISCONTINUED | OUTPATIENT
Start: 2023-08-28 | End: 2023-08-31 | Stop reason: HOSPADM

## 2023-08-28 RX ORDER — MORPHINE SULFATE 4 MG/ML
2 INJECTION, SOLUTION INTRAMUSCULAR; INTRAVENOUS EVERY 4 HOURS PRN
Status: DISCONTINUED | OUTPATIENT
Start: 2023-08-28 | End: 2023-08-31 | Stop reason: HOSPADM

## 2023-08-28 RX ORDER — ONDANSETRON 2 MG/ML
INJECTION INTRAMUSCULAR; INTRAVENOUS
Status: COMPLETED
Start: 2023-08-28 | End: 2023-08-28

## 2023-08-28 RX ORDER — FENTANYL CITRATE 50 UG/ML
25 INJECTION, SOLUTION INTRAMUSCULAR; INTRAVENOUS EVERY 5 MIN PRN
Status: DISCONTINUED | OUTPATIENT
Start: 2023-08-28 | End: 2023-08-28 | Stop reason: HOSPADM

## 2023-08-28 RX ORDER — CLOPIDOGREL BISULFATE 75 MG/1
75 TABLET ORAL DAILY
Status: DISCONTINUED | OUTPATIENT
Start: 2023-08-29 | End: 2023-08-28 | Stop reason: SDUPTHER

## 2023-08-28 RX ORDER — ACETAMINOPHEN 10 MG/ML
1000 INJECTION, SOLUTION INTRAVENOUS ONCE
Status: COMPLETED | OUTPATIENT
Start: 2023-08-28 | End: 2023-08-28

## 2023-08-28 RX ORDER — SODIUM CHLORIDE 0.9 % (FLUSH) 0.9 %
10 SYRINGE (ML) INJECTION
Status: DISCONTINUED | OUTPATIENT
Start: 2023-08-28 | End: 2023-08-28 | Stop reason: HOSPADM

## 2023-08-28 RX ORDER — SODIUM CHLORIDE 9 MG/ML
INJECTION, SOLUTION INTRAVENOUS CONTINUOUS
Status: ACTIVE | OUTPATIENT
Start: 2023-08-28 | End: 2023-08-28

## 2023-08-28 RX ORDER — HYDROMORPHONE HYDROCHLORIDE 2 MG/ML
0.2 INJECTION, SOLUTION INTRAMUSCULAR; INTRAVENOUS; SUBCUTANEOUS EVERY 5 MIN PRN
Status: DISCONTINUED | OUTPATIENT
Start: 2023-08-28 | End: 2023-08-28 | Stop reason: HOSPADM

## 2023-08-28 RX ORDER — PHENYLEPHRINE HYDROCHLORIDE 10 MG/ML
INJECTION INTRAVENOUS
Status: DISCONTINUED | OUTPATIENT
Start: 2023-08-28 | End: 2023-08-28

## 2023-08-28 RX ORDER — CLOPIDOGREL BISULFATE 75 MG/1
75 TABLET ORAL DAILY
Status: DISCONTINUED | OUTPATIENT
Start: 2023-08-28 | End: 2023-08-31 | Stop reason: HOSPADM

## 2023-08-28 RX ORDER — CEFAZOLIN SODIUM 1 G/3ML
INJECTION, POWDER, FOR SOLUTION INTRAMUSCULAR; INTRAVENOUS
Status: DISCONTINUED | OUTPATIENT
Start: 2023-08-28 | End: 2023-08-28

## 2023-08-28 RX ORDER — SODIUM CHLORIDE 0.9 % (FLUSH) 0.9 %
10 SYRINGE (ML) INJECTION
Status: DISCONTINUED | OUTPATIENT
Start: 2023-08-28 | End: 2023-08-28

## 2023-08-28 RX ADMIN — HEPARIN SODIUM 10000 UNITS: 1000 INJECTION, SOLUTION INTRAVENOUS; SUBCUTANEOUS at 07:08

## 2023-08-28 RX ADMIN — HYDROMORPHONE HYDROCHLORIDE 0.2 MG: 2 INJECTION, SOLUTION INTRAMUSCULAR; INTRAVENOUS; SUBCUTANEOUS at 12:08

## 2023-08-28 RX ADMIN — ONDANSETRON 4 MG: 2 INJECTION INTRAMUSCULAR; INTRAVENOUS at 10:08

## 2023-08-28 RX ADMIN — PHENYLEPHRINE HYDROCHLORIDE 200 MCG: 10 INJECTION INTRAVENOUS at 08:08

## 2023-08-28 RX ADMIN — SODIUM CHLORIDE, SODIUM GLUCONATE, SODIUM ACETATE, POTASSIUM CHLORIDE AND MAGNESIUM CHLORIDE: 526; 502; 368; 37; 30 INJECTION, SOLUTION INTRAVENOUS at 07:08

## 2023-08-28 RX ADMIN — PHENYLEPHRINE HYDROCHLORIDE 100 MCG: 10 INJECTION INTRAVENOUS at 07:08

## 2023-08-28 RX ADMIN — PROTAMINE SULFATE 25 MG: 10 INJECTION, SOLUTION INTRAVENOUS at 08:08

## 2023-08-28 RX ADMIN — LOSARTAN POTASSIUM 50 MG: 50 TABLET, FILM COATED ORAL at 10:08

## 2023-08-28 RX ADMIN — CEFAZOLIN 1 G: 330 INJECTION, POWDER, FOR SOLUTION INTRAMUSCULAR; INTRAVENOUS at 07:08

## 2023-08-28 RX ADMIN — ONDANSETRON 4 MG: 2 INJECTION INTRAMUSCULAR; INTRAVENOUS at 07:08

## 2023-08-28 RX ADMIN — PHENYLEPHRINE HYDROCHLORIDE 50 MCG: 10 INJECTION INTRAVENOUS at 07:08

## 2023-08-28 RX ADMIN — ROPINIROLE HYDROCHLORIDE 1 MG: 1 TABLET, FILM COATED ORAL at 10:08

## 2023-08-28 RX ADMIN — FENTANYL CITRATE 50 MCG: 50 INJECTION, SOLUTION INTRAMUSCULAR; INTRAVENOUS at 07:08

## 2023-08-28 RX ADMIN — MIDAZOLAM 1 MG: 1 INJECTION INTRAMUSCULAR; INTRAVENOUS at 07:08

## 2023-08-28 RX ADMIN — PROPOFOL 75 MCG/KG/MIN: 10 INJECTION, EMULSION INTRAVENOUS at 07:08

## 2023-08-28 RX ADMIN — METOPROLOL SUCCINATE 50 MG: 50 TABLET, EXTENDED RELEASE ORAL at 10:08

## 2023-08-28 RX ADMIN — HYDROCODONE BITARTRATE AND ACETAMINOPHEN 1 TABLET: 5; 325 TABLET ORAL at 03:08

## 2023-08-28 RX ADMIN — ATORVASTATIN CALCIUM 40 MG: 40 TABLET, FILM COATED ORAL at 10:08

## 2023-08-28 RX ADMIN — CLOPIDOGREL BISULFATE 75 MG: 75 TABLET ORAL at 12:08

## 2023-08-28 RX ADMIN — HYDROCODONE BITARTRATE AND ACETAMINOPHEN 1 TABLET: 5; 325 TABLET ORAL at 10:08

## 2023-08-28 RX ADMIN — HYDROMORPHONE HYDROCHLORIDE 0.2 MG: 2 INJECTION, SOLUTION INTRAMUSCULAR; INTRAVENOUS; SUBCUTANEOUS at 10:08

## 2023-08-28 RX ADMIN — ACETAMINOPHEN 1000 MG: 10 INJECTION, SOLUTION INTRAVENOUS at 09:08

## 2023-08-28 RX ADMIN — INSULIN ASPART 2 UNITS: 100 INJECTION, SOLUTION INTRAVENOUS; SUBCUTANEOUS at 10:08

## 2023-08-28 NOTE — OP NOTE
[facility]                       [address] [place] [address]    PATIENT NAME:      DO NIELSON   YOB: 1950  CSN:               [id number]  MRN:               [id number]  ADMIT DATE:        08/28/2023 05:05:00  PHYSICIAN:         Samuel Givens MD                          OPERATIVE REPORT      DATE OF SURGERY:    08/28/2023 00:00:00    SURGEON:  Samuel Givens MD    PREOPERATIVE DIAGNOSIS:  Critical aortic stenosis.    POSTOPERATIVE DIAGNOSIS:  Critical aortic stenosis.    PROCEDURE PERFORMED:  Transcatheter aortic valve replacement via the right   common femoral artery with a #29 Medtronic Evolut valve.    CARDIOLOGIST:  Alok Coronado MD.    ASSISTANT:  Nelson Farrell PA-C.    INDICATIONS:  The patient is a 73-year-old female who has had increasing   shortness of breath with dyspnea on exertion for several months.  She has been   diagnosed with critical aortic stenosis.  Her echocardiographic and physiologic   data indicate she would be a good candidate for transcatheter solution.    DESCRIPTION OF PROCEDURE:  The patient was brought to the cardiac cath lab and   placed in supine position.  Intravenous sedation was administered.  Both groins   were prepped and draped in a sterile field.  1% Xylocaine was used for local   anesthesia.  Using ultrasound guidance and micropuncture technique, the right   common femoral artery was entered.  Flush angiogram showed satisfactory   placement and an 8-Citizen of [place] sheath was placed.  Similarly, ultrasound guidance was   used to enter the left common femoral artery and a 6-Citizen of [place] sheath was placed.    The left common femoral vein was entered and a long 6-Citizen of [place] sheath was   delivered to the cavoatrial junction.  The patient was then systemically   heparinized.  A transvenous pacemaker was implanted into the right ventricle.  A   pigtail catheter was passed from  the left side up to the noncoronary cusp and   our deployment angle was identified.  Once satisfactory ACT was achieved, 2   Perclose devices were placed in the right common femoral artery and a sheath was   passed into the distal thoracic aorta.  The aortic valve was crossed with an   0.035 Glidewire and exchanged for a Safari wire.  The device was then passed   after the sheath was removed.  It was passed across the valve and positioned   under fluoroscopic control.  Then with pacing at 120 beats per minute, the valve   sheath was then carefully and slowly retracted to a point where the valve was   80% deployed.  The pacing was turned off and the valve position was determined   to be satisfactory and the remainder of the valve was then deployed.  The Safari   wire was then returned into the left ventricle.  The deployment device was   removed.  Echocardiogram showed a mean gradient of 6 with no paravalvular leak   or aortic insufficiency.  Flush angiogram showed no aortic insufficiency as   well.  The deployment device was removed.  The sheath was replaced and   repositioned.  The aortic valve was then re-crossed with a pigtail catheter and   physiologic data were obtained.  Please see Dr. Coronado's dictation for those   numbers.  Once this was completed, the pigtail was pulled back to the aortic   bifurcation.  The sheath was removed and the Perclose devices were closed.    Flush angiogram showed no extravasation.  An Angio-Seal device was then placed   on the right side and the pigtail catheter was removed and an Angio-Seal device   was placed and positioned on the left side as well.  The pacing catheter was   removed.  Protamine was administered and the patient was transported to the   recovery area.        ______________________________  MD BELINDA Turner/ROSALEES  DD:  08/28/2023  Time:  08:55AM  DT:  08/28/2023  Time:  09:24AM  Job #:  [id number]      OPERATIVE REPORT

## 2023-08-28 NOTE — ANESTHESIA PROCEDURE NOTES
Arterial    Diagnosis: aortic stenosis    Patient location during procedure: pre-op  Procedure start time: 8/28/2023 6:45 AM  Timeout: 8/28/2023 6:45 AM  Procedure end time: 8/28/2023 6:50 AM    Staffing  Authorizing Provider: Hudson Michael DO  Performing Provider: Hudson Michael DO    Staffing  Performed by: Hudson Michael DO  Authorized by: Hudson Michael DO    Anesthesiologist was present at the time of the procedure.    Preanesthetic Checklist  Completed: patient identified, IV checked, site marked, risks and benefits discussed, surgical consent, monitors and equipment checked, pre-op evaluation, timeout performed and anesthesia consent givenArterial  Skin Prep: chlorhexidine gluconate  Local Infiltration: lidocaine  Orientation: left  Location: radial    Catheter Size: 20 G  Catheter placement by Ultrasound guidance. Heme positive aspiration all ports.   Vessel Caliber: medium, patent, compressibility normal  Vascular Doppler:  not done  Needle advanced into vessel with real time Ultrasound guidance.Insertion Attempts: 1  Assessment  Dressing: secured with tape and tegaderm  Patient: Tolerated well

## 2023-08-28 NOTE — Clinical Note
The catheter was inserted into the, was removed from the and was inserted over the wire into the aorta. Aortic Root Angiogram performed.

## 2023-08-28 NOTE — BRIEF OP NOTE
[facility] - Cath Lab Services  Brief Post-Operative Note    SUMMARY     Surgery Date: 8/28/2023     Procedure Performed: TAVR w/ #29 Evolute valve via [facility]    Surgeon(s) and Role: Samuel Givens MD - Primary  Dr Coronado- cardiologist    Assistant: Nelson Farrell PA-C    Pre-op Diagnosis:  Aortic stenosis    Post-op Diagnosis:  same    Anesthesia: MAC    Operative Findings:     Estimated Blood Loss: 50cc         Specimens:

## 2023-08-28 NOTE — TRANSFER OF CARE
"Anesthesia Transfer of Care Note    Patient: Bria Lacy    Procedure(s) Performed: Procedure(s) (LRB):  REPLACEMENT, AORTIC VALVE, TRANSCATHETER (TAVR) (N/A)    Patient location: PACU    Anesthesia Type: general    Transport from OR: Transported from OR on room air with adequate spontaneous ventilation    Post pain: adequate analgesia    Post assessment: no apparent anesthetic complications    Post vital signs: stable    Level of consciousness: awake and responds to stimulation    Nausea/Vomiting: no nausea/vomiting    Complications: none    Transfer of care protocol was followed      Last vitals:   Visit Vitals  BP (!) 89/51 (BP Location: Right arm, Patient Position: Lying)   Pulse 92   Temp 36.8 °C (98.2 °F) (Temporal)   Resp 18   Ht 5' 6" (1.676 m)   Wt 92.7 kg (204 lb 5.9 oz)   LMP  (LMP Unknown)   SpO2 100%   Breastfeeding No   BMI 32.99 kg/m²     "

## 2023-08-28 NOTE — INTERVAL H&P NOTE
Patient name: Bria Lacy  MRN: [id number]  : 1950  Cath Lab Procedure H&P Update    Pre-Procedure Assessment:    I saw and examined the patient face to face. The patient has been re-evaluated and her condition is unchanged. The reason for admission, procedure and care is still present.  Based on the patients H&P, pre-procedure physical exam, relevant diagnostic studies, NPO status and information obtained from the patient, I determined the patient is an appropriate candidate for the proposed procedure and anesthesia planned. I further certify the anesthesia risks, benefits and options have been explained to the patient to which she agrees as documented on the procedural consent.

## 2023-08-28 NOTE — ANESTHESIA PREPROCEDURE EVALUATION
08/28/2023  Bria Lacy is a 73 y.o., female.      Pre-op Assessment    I have reviewed the Patient Summary Reports.     I have reviewed the Nursing Notes. I have reviewed the NPO Status.   I have reviewed the Medications.     Review of Systems  Anesthesia Hx:  No problems with previous Anesthesia    Social:  Non-Smoker    EENT/Dental:   Blind left eye     Cardiovascular:   Hypertension Valvular problems/Murmurs (AS/MS), AS Denies MI. CAD   Dysrhythmias atrial fibrillation CHF hyperlipidemia    Pulmonary:   Denies COPD.  Denies Asthma.    Renal/:   Denies Chronic Renal Disease. no renal calculi     Hepatic/GI:   GERD, well controlled Denies Hepatitis.    Musculoskeletal:   Ambulates with a walker  left leg drags   Neurological:   TIA, (memory) Denies CVA. Denies Seizures. Restless leg syndrome   Endocrine:   Diabetes, well controlled, type 2, using insulin Denies Hypothyroidism. Denies Hyperthyroidism.  Obesity / BMI > 30      Physical Exam  General: Well nourished, Cooperative, Alert and Oriented    Airway:  Mallampati: I   Mouth Opening: Normal  TM Distance: Normal  Tongue: Normal  Neck ROM: Normal ROM    Dental:  Intact        Anesthesia Plan  Type of Anesthesia, risks & benefits discussed:    Anesthesia Type: Gen ETT  Intra-op Monitoring Plan: Standard ASA Monitors and Art Line  Induction:  IV  Airway Plan: Video  Informed Consent: Informed consent signed with the Patient and all parties understand the risks and agree with anesthesia plan.  All questions answered.   ASA Score: 4  Day of Surgery Review of History & Physical: H&P Update referred to the surgeon/provider.  Anesthesia Plan Notes: Curry requested    Ready For Surgery From Anesthesia Perspective.     .

## 2023-08-28 NOTE — INTERVAL H&P NOTE
Patient name: Bria Lacy  MRN: [id number]  : 1950  Cath Lab Procedure H&P Update    Pre-Procedure Assessment:    I saw and examined the patient face to face. The patient has been re-evaluated and her condition is unchanged. The reason for admission, procedure and care is still present.  Based on the patients H&P, pre-procedure physical exam, relevant diagnostic studies, NPO status and information obtained from the patient, I determined the patient is an appropriate candidate for the proposed procedure and anesthesia planned. I further certify the anesthesia risks, benefits and options have been explained to the patient to which she agrees as documented on the procedural consent.

## 2023-08-28 NOTE — ANESTHESIA POSTPROCEDURE EVALUATION
Anesthesia Post Evaluation    Patient: Bria Lacy    Procedure(s) Performed: Procedure(s) (LRB):  REPLACEMENT, AORTIC VALVE, TRANSCATHETER (TAVR) (N/A)    Final Anesthesia Type: general      Patient location during evaluation: PACU  Patient participation: Yes- Able to Participate  Level of consciousness: awake and alert  Post-procedure vital signs: reviewed and stable  Pain management: adequate  Airway patency: patent    PONV status at discharge: No PONV  Anesthetic complications: no      Cardiovascular status: blood pressure returned to baseline  Respiratory status: unassisted and spontaneous ventilation  Hydration status: euvolemic  Follow-up needed           Vitals Value Taken Time   BP 96/57 08/28/23 1232   Temp 36.8 °C (98.2 °F) 08/28/23 0852   Pulse 87 08/28/23 1236   Resp 14 08/28/23 1236   SpO2 100 % 08/28/23 1236   Vitals shown include unvalidated device data.      No case tracking events are documented in the log.      Pain/Darling Score: Pain Rating Prior to Med Admin: 7 (8/28/2023 12:00 PM)  Darling Score: 10 (8/28/2023 10:30 AM)

## 2023-08-29 LAB
ANION GAP SERPL CALC-SCNC: 5 MEQ/L
APPEARANCE UR: ABNORMAL
AV INDEX (PROSTH): 0.37
AV MEAN GRADIENT: 11 MMHG
AV PEAK GRADIENT: 17 MMHG
AV VALVE AREA BY VELOCITY RATIO: 1.02 CM²
AV VALVE AREA: 0.93 CM²
AV VELOCITY RATIO: 0.4
B PERT.PT PRMT NPH QL NAA+NON-PROBE: NOT DETECTED
BACTERIA #/AREA URNS AUTO: ABNORMAL /HPF
BASOPHILS # BLD AUTO: 0.03 X10(3)/MCL
BASOPHILS NFR BLD AUTO: 0.5 %
BILIRUB UR QL STRIP.AUTO: NEGATIVE
BSA FOR ECHO PROCEDURE: 2.08 M2
BUN SERPL-MCNC: 41.5 MG/DL (ref 9.8–20.1)
C PNEUM DNA NPH QL NAA+NON-PROBE: NOT DETECTED
CALCIUM SERPL-MCNC: 8.1 MG/DL (ref 8.4–10.2)
CHLORIDE SERPL-SCNC: 112 MMOL/L (ref 98–107)
CO2 SERPL-SCNC: 21 MMOL/L (ref 23–31)
COLOR UR: YELLOW
CREAT SERPL-MCNC: 1.52 MG/DL (ref 0.55–1.02)
CREAT/UREA NIT SERPL: 27
DOP CALC AO PEAK VEL: 2.07 M/S
DOP CALC AO VTI: 39.8 CM
DOP CALC LVOT AREA: 2.5 CM2
DOP CALC LVOT DIAMETER: 1.8 CM
DOP CALC LVOT PEAK VEL: 0.83 M/S
DOP CALC LVOT STROKE VOLUME: 37.13 CM3
DOP CALCLVOT PEAK VEL VTI: 14.6 CM
EOSINOPHIL # BLD AUTO: 0.12 X10(3)/MCL (ref 0–0.9)
EOSINOPHIL NFR BLD AUTO: 1.8 %
ERYTHROCYTE [DISTWIDTH] IN BLOOD BY AUTOMATED COUNT: 14.5 % (ref 11.5–17)
GFR SERPLBLD CREATININE-BSD FMLA CKD-EPI: 36 MLS/MIN/1.73/M2
GLUCOSE SERPL-MCNC: 137 MG/DL (ref 82–115)
GLUCOSE UR QL STRIP.AUTO: ABNORMAL
HADV DNA NPH QL NAA+NON-PROBE: NOT DETECTED
HCOV 229E RNA NPH QL NAA+NON-PROBE: NOT DETECTED
HCOV HKU1 RNA NPH QL NAA+NON-PROBE: NOT DETECTED
HCOV NL63 RNA NPH QL NAA+NON-PROBE: NOT DETECTED
HCOV OC43 RNA NPH QL NAA+NON-PROBE: NOT DETECTED
HCT VFR BLD AUTO: 34.8 % (ref 37–47)
HGB BLD-MCNC: 10.9 G/DL (ref 12–16)
HMPV RNA NPH QL NAA+NON-PROBE: NOT DETECTED
HPIV1 RNA NPH QL NAA+NON-PROBE: NOT DETECTED
HPIV2 RNA NPH QL NAA+NON-PROBE: NOT DETECTED
HPIV3 RNA NPH QL NAA+NON-PROBE: NOT DETECTED
HPIV4 RNA NPH QL NAA+NON-PROBE: NOT DETECTED
IMM GRANULOCYTES # BLD AUTO: 0.03 X10(3)/MCL (ref 0–0.04)
IMM GRANULOCYTES NFR BLD AUTO: 0.5 %
KETONES UR QL STRIP.AUTO: NEGATIVE
LEUKOCYTE ESTERASE UR QL STRIP.AUTO: ABNORMAL
LVOT MG: 2 MMHG
LVOT MV: 0.66 CM/S
LYMPHOCYTES # BLD AUTO: 1.37 X10(3)/MCL (ref 0.6–4.6)
LYMPHOCYTES NFR BLD AUTO: 20.9 %
M PNEUMO DNA NPH QL NAA+NON-PROBE: NOT DETECTED
MCH RBC QN AUTO: 28.7 PG (ref 27–31)
MCHC RBC AUTO-ENTMCNC: 31.3 G/DL (ref 33–36)
MCV RBC AUTO: 91.6 FL (ref 80–94)
MONOCYTES # BLD AUTO: 0.89 X10(3)/MCL (ref 0.1–1.3)
MONOCYTES NFR BLD AUTO: 13.6 %
NEUTROPHILS # BLD AUTO: 4.12 X10(3)/MCL (ref 2.1–9.2)
NEUTROPHILS NFR BLD AUTO: 62.7 %
NITRITE UR QL STRIP.AUTO: NEGATIVE
NRBC BLD AUTO-RTO: 0 %
NT-PROBNP SERPL IA-MCNC: 3856 PG/ML
PH UR STRIP.AUTO: 5.5 [PH]
PLATELET # BLD AUTO: 110 X10(3)/MCL (ref 130–400)
PMV BLD AUTO: 10.2 FL (ref 7.4–10.4)
POCT GLUCOSE: 147 MG/DL (ref 70–110)
POCT GLUCOSE: 176 MG/DL (ref 70–110)
POCT GLUCOSE: 223 MG/DL (ref 70–110)
POTASSIUM SERPL-SCNC: 4.6 MMOL/L (ref 3.5–5.1)
PROT UR QL STRIP.AUTO: ABNORMAL
RBC # BLD AUTO: 3.8 X10(6)/MCL (ref 4.2–5.4)
RBC #/AREA URNS AUTO: <5 /HPF
RBC UR QL AUTO: NEGATIVE
RSV RNA NPH QL NAA+NON-PROBE: NOT DETECTED
RV+EV RNA NPH QL NAA+NON-PROBE: NOT DETECTED
SODIUM SERPL-SCNC: 138 MMOL/L (ref 136–145)
SP GR UR STRIP.AUTO: 1.03 (ref 1–1.03)
SQUAMOUS #/AREA URNS AUTO: 12 /HPF
UROBILINOGEN UR STRIP-ACNC: 0.2
WBC # SPEC AUTO: 6.56 X10(3)/MCL (ref 4.5–11.5)
WBC #/AREA URNS AUTO: <5 /HPF

## 2023-08-29 PROCEDURE — 80048 BASIC METABOLIC PNL TOTAL CA: CPT | Performed by: INTERNAL MEDICINE

## 2023-08-29 PROCEDURE — 81001 URINALYSIS AUTO W/SCOPE: CPT | Performed by: NURSE PRACTITIONER

## 2023-08-29 PROCEDURE — 87798 DETECT AGENT NOS DNA AMP: CPT | Performed by: INTERNAL MEDICINE

## 2023-08-29 PROCEDURE — 94761 N-INVAS EAR/PLS OXIMETRY MLT: CPT

## 2023-08-29 PROCEDURE — 93010 EKG 12-LEAD: ICD-10-PCS | Mod: ,,, | Performed by: INTERNAL MEDICINE

## 2023-08-29 PROCEDURE — 25000003 PHARM REV CODE 250: Performed by: INTERNAL MEDICINE

## 2023-08-29 PROCEDURE — 93005 ELECTROCARDIOGRAM TRACING: CPT

## 2023-08-29 PROCEDURE — 87040 BLOOD CULTURE FOR BACTERIA: CPT

## 2023-08-29 PROCEDURE — 93010 ELECTROCARDIOGRAM REPORT: CPT | Mod: ,,, | Performed by: INTERNAL MEDICINE

## 2023-08-29 PROCEDURE — 63600175 PHARM REV CODE 636 W HCPCS: Performed by: INTERNAL MEDICINE

## 2023-08-29 PROCEDURE — 21400001 HC TELEMETRY ROOM

## 2023-08-29 PROCEDURE — 85025 COMPLETE CBC W/AUTO DIFF WBC: CPT | Performed by: INTERNAL MEDICINE

## 2023-08-29 RX ADMIN — HYDROCODONE BITARTRATE AND ACETAMINOPHEN 1 TABLET: 5; 325 TABLET ORAL at 02:08

## 2023-08-29 RX ADMIN — HYDROCODONE BITARTRATE AND ACETAMINOPHEN 1 TABLET: 5; 325 TABLET ORAL at 09:08

## 2023-08-29 RX ADMIN — APIXABAN 5 MG: 5 TABLET, FILM COATED ORAL at 09:08

## 2023-08-29 RX ADMIN — ATORVASTATIN CALCIUM 40 MG: 40 TABLET, FILM COATED ORAL at 09:08

## 2023-08-29 RX ADMIN — MORPHINE SULFATE 2 MG: 4 INJECTION INTRAVENOUS at 02:08

## 2023-08-29 RX ADMIN — ROPINIROLE HYDROCHLORIDE 1 MG: 1 TABLET, FILM COATED ORAL at 09:08

## 2023-08-29 RX ADMIN — INSULIN ASPART 2 UNITS: 100 INJECTION, SOLUTION INTRAVENOUS; SUBCUTANEOUS at 02:08

## 2023-08-29 RX ADMIN — HYDROCODONE BITARTRATE AND ACETAMINOPHEN 1 TABLET: 5; 325 TABLET ORAL at 07:08

## 2023-08-29 RX ADMIN — CLOPIDOGREL BISULFATE 75 MG: 75 TABLET ORAL at 09:08

## 2023-08-29 NOTE — PLAN OF CARE
08/29/23 0922   Discharge Assessment   Assessment Type Discharge Planning Assessment   Confirmed/corrected address, phone number and insurance Yes   Confirmed Demographics Correct on Facesheet   Source of Information family  (Bolivar Lacy (Son)   [phone] (Mobile))   If unable to respond/provide information was family/caregiver contacted? Yes   Contact Name/Number Bolivar Lacy (Son)   [phone] (Mobile)   Communicated RENEE with patient/caregiver Date not available/Unable to determine   Reason For Admission I35.0 Aortic valve stenosis (Aortic valve stenosis (I35.0)); TAVR performed on: 8/28/2023.   People in Home child(kizzy), adult  (Patient lives with her son: Bolivar Lacy and daughter-in-law: Kesha Lacy.)   Facility Arrived From: Private residence.   Do you expect to return to your current living situation? Yes   Do you have help at home or someone to help you manage your care at home? Yes   Who are your caregiver(s) and their phone number(s)? Patient lives with her son: Bolivar Lacy and daughter-in-law: Kesha Lacy.   Prior to hospitilization cognitive status: Alert/Oriented   Current cognitive status: Alert/Oriented   Walking or Climbing Stairs ambulation difficulty, requires equipment   Mobility Management Patient uses a rollator as need for mobility concerns.   Home Accessibility wheelchair accessible  (There are (4) steps to climb prior to entering her private residence. Patient uses a rollator for mobility needs PRN.)   Home Layout Able to live on 1st floor   Equipment Currently Used at Home rollator;walker, rolling;shower chair   Readmission within 30 days? No   Patient currently being followed by outpatient case management? No   Do you currently have service(s) that help you manage your care at home? Yes   Name and Contact number of agency Patient is presently active with [facility] at present.   Is the pt/caregiver preference to resume services with current agency Yes   Do you take prescription  medications? Yes   Do you have prescription coverage? Yes   Coverage Payor:  HU[employer] MANAGED MEDICARE - HU[employer] MEDICARE HMO   Do you have any problems affording any of your prescribed medications? No   Is the patient taking medications as prescribed? yes   Who is going to help you get home at discharge? Bolivar Lacy (Son)   [phone] (Mobile) and daughter-in-law: Kesha Lacy   How do you get to doctors appointments? family or friend will provide   Are you on dialysis? No   Do you take coumadin? No   DME Needed Upon Discharge  none   Discharge Plan discussed with: Adult children  (Bolivar Lacy (Son)   [phone] (Mobile) and daughter-in-law: Kesha Lacy)   Transition of Care Barriers None   Discharge Plan A Home Health  (Patient is currently active with [facility] [place] for () services and will resume services with this agency upon discharge.)   Discharge Plan B Home Health   Social Connections   How often do you attend Gnosticism or Mu-ism services? More than 4   Do you belong to any clubs or organizations such as Gnosticism groups, unions, fraternal or athletic groups, or school groups? Yes  (Patient attends mass at [facility] [place].)   How often do you attend meetings of the clubs or organizations you belong to? More than 4   Are you , , , , never , or living with a partner?    Alcohol Use   Q1: How often do you have a drink containing alcohol? Never   Q2: How many drinks containing alcohol do you have on a typical day when you are drinking? None   Q3: How often do you have six or more drinks on one occasion? Never   OTHER   Name(s) of People in Home Bolivar Lacy (Son)   [phone] (Mobile) and daughter-in-law: Keshalakesha Lacy.

## 2023-08-29 NOTE — NURSING
Nurses Note -- 4 Eyes      8/28/2023   7:41 PM      Skin assessed during: Admit      [x] No Altered Skin Integrity Present    []Prevention Measures Documented      [] Yes- Altered Skin Integrity Present or Discovered   [] LDA Added if Not in Epic (Describe Wound)   [] New Altered Skin Integrity was Present on Admit and Documented in LDA   [] Wound Image Taken    Wound Care Consulted? No    Attending Nurse:  Ghazala Perez LPN    Second RN/Staff Member:   Azra Lambert LPN

## 2023-08-29 NOTE — NURSING
Received bedside SBAR from Ghazala ZUNILDA. Discussed HX, DX, POC and recent procedure. Reviewed Vitals, Tele, and new meds, Met patient and assumed care. Observed patient resting quietly in bed without needs or complaints. Family members present at bedside. Will continue to monitor for needs and provide care as needed.

## 2023-08-29 NOTE — PROGRESS NOTES
[facility] Tele[facility]    Cardiology  Progress Note    Patient Name: Bria Lacy  MRN: [id number]  Admission Date: 8/28/2023  Hospital Length of Stay: 1 days  Code Status: No Order   Attending Physician: Alok Coronado MD   Primary Care Physician: Bin Pratt Jr., MD  Expected Discharge Date:   Principal Problem:<principal problem not specified>    Subjective:     Brief HPI:   Ms. Lacy is a 72 y/o female with a history of Native CAD, VHD s/p TAVR, AFIB/AFLUT, HTN, HLD, who is known to [facility], Dr. Figueroa/Cliff. She presented to [facility] for a scheduled TAVR on 8.28.23. She underwent a successful TAVR  with a 29  mm Evolute FX.     Hospital Course:   8.29.23: NAD. Marginal BP. Fever 100.4 last night.     PMH: Native CAD, VHD, AFIB/AFLUT HTN, HLD, Obesity  PSH: Finger Surgery, Hysterectomy, Tonsillectomy, Appendectomy, Cholecystectomy  Family History: Father - Cancer  Social History: Denies smoking, illicit drug use, and alcohol use    Previous Cardiac Diagnostics:   TAVR (8.28.23):   successful TAVR  with a 29  mm Evolute FX    Limited ECHO (8.28.23):  Limited echo during TAVR procedure.  Well seated self-expaning TAVR valve. No significant paravalvular leak. Mean gradient 3 mmHg. No pericardial effusion.    Calcium Score (7.27.23): 2267    LHC (7.31.23):  Non-obstructive CAD  Proximal RCA  mid diffuse with focal 30-50% obstructions. Proximal LAD mild diffusion occlusion. Distal LAD ostial 30-50%     ECHO (5.18.23):  The left ventricle is decreased in size. Global left ventricular systolic function is borderline normal. The left ventricular ejection fraction is 50%. Left ventricular diastolic function is indeterminate. Noted left ventricular hypertrophy. Concentric left ventricular hypertrophy is present. It is moderate. Moderate aortic valve stenosis is present. The aortic valve area planimetry is 1.1 cm². Aortic valve area continuity equation is 1.1 cm². The trans-aortic  peak velocity is 3.7 m/s. The trans-aortic peak gradient is 55.5 mmHg. The trans-aortic mean gradient is 31.2 mmHg. DI-0.56. Moderate mitral annular calcification is noted. The estimated pulmonary artery systolic pressure is 27 mmHg assuming a right atrial pressure of 8 mmHg.     CTA Chest (5.18.23):  Negative for PE    Carotid US (4.27.23):  Bilateral plaque formation in the carotid bulbs and proximal internal carotid arteries bilaterally, greater on the right. There is also elevation of peak velocity in the right internal carotid artery indicating moderate stenosis by velocity criteria.      Review of Systems   Constitutional: Positive for fever.   Cardiovascular:  Negative for chest pain, dyspnea on exertion, leg swelling and palpitations.   Respiratory:  Negative for shortness of breath.    Musculoskeletal:         Left Groin Pain   All other systems reviewed and are negative.      Objective:     Vital Signs (Most Recent):  Temp: 99.3 °F (37.4 °C) (08/29/23 0749)  Pulse: 71 (08/29/23 0749)  Resp: 18 (08/29/23 0749)  BP: 101/68 (08/29/23 0749)  SpO2: (!) 94 % (08/29/23 0749) Vital Signs (24h Range):  Temp:  [98 °F (36.7 °C)-100.8 °F (38.2 °C)] 99.3 °F (37.4 °C)  Pulse:  [] 71  Resp:  [10-23] 18  SpO2:  [94 %-100 %] 94 %  BP: ()/() 101/68  Arterial Line BP: ()/(44-66) 123/53     Weight: 92.7 kg (204 lb 5.9 oz)  Body mass index is 32.99 kg/m².    SpO2: (!) 94 %       Intake/Output Summary (Last 24 hours) at 8/29/2023 0814  Last data filed at 8/28/2023 1730  Gross per 24 hour   Intake 400 ml   Output 600 ml   Net -200 ml       Lines/Drains/Airways       Epidural Line  Duration                  Perineural Analgesia/Anesthesia Assessment (Motor Function-Bromage) 08/28/23 0744 1 day              Peripheral Intravenous Line  Duration                  Peripheral IV - Single Lumen 08/28/23 0530 20 G Anterior;Right Upper Arm 1 day         Peripheral IV - Single Lumen 08/28/23 0545 20 G  Anterior;Right Forearm 1 day                  Significant Labs:   Recent Results (from the past 72 hour(s))   POCT glucose    Collection Time: 08/28/23  6:08 AM   Result Value Ref Range    POCT Glucose 157 (H) 70 - 110 mg/dL   Protime-INR    Collection Time: 08/28/23  6:25 AM   Result Value Ref Range    PT 14.5 12.5 - 14.5 seconds    INR 1.1 <=1.3   Type & Screen    Collection Time: 08/28/23  6:27 AM   Result Value Ref Range    Group & Rh O POS     Indirect Tani GEL NEG     Specimen Outdate 08/31/2023 23:59    Prepare RBC 2 Units; surgical procedure    Collection Time: 08/28/23  6:27 AM   Result Value Ref Range    UNIT NUMBER [id number]     UNIT ABO/RH O POS     DISPENSE STATUS Selected     Unit Expiration [id number]     Product Code Z0816X61     Unit Blood Type Code 5100     CROSSMATCH INTERPRETATION Compatible     UNIT NUMBER [id number]     UNIT ABO/RH O POS     DISPENSE STATUS Selected     Unit Expiration [id number]     Product Code Z4157Y30     Unit Blood Type Code 5100     CROSSMATCH INTERPRETATION Compatible    Comprehensive Metabolic Panel    Collection Time: 08/28/23  6:31 AM   Result Value Ref Range    Sodium Level 143 136 - 145 mmol/L    Potassium Level 5.4 (H) 3.5 - 5.1 mmol/L    Chloride 116 (H) 98 - 107 mmol/L    Carbon Dioxide 16 (L) 23 - 31 mmol/L    Glucose Level 159 (H) 82 - 115 mg/dL    Blood Urea Nitrogen 39.1 (H) 9.8 - 20.1 mg/dL    Creatinine 1.39 (H) 0.55 - 1.02 mg/dL    Calcium Level Total 8.9 8.4 - 10.2 mg/dL    Protein Total 6.8 5.8 - 7.6 gm/dL    Albumin Level 4.0 3.4 - 4.8 g/dL    Globulin 2.8 2.4 - 3.5 gm/dL    Albumin/Globulin Ratio 1.4 1.1 - 2.0 ratio    Bilirubin Total 0.4 <=1.5 mg/dL    Alkaline Phosphatase 61 40 - 150 unit/L    Alanine Aminotransferase 16 0 - 55 unit/L    Aspartate Aminotransferase 41 (H) 5 - 34 unit/L    eGFR 40 mls/min/1.73/m2   MRSA PCR    Collection Time: 08/28/23  6:45 AM   Result Value Ref Range    MRSA PCR SCRN ([facility]) Not Detected Not Detected    POCT glucose    Collection Time: 08/28/23  9:02 AM   Result Value Ref Range    POCT Glucose 147 (H) 70 - 110 mg/dL   Echo    Collection Time: 08/28/23  9:10 AM   Result Value Ref Range    BSA 2.08 m2    AV mean gradient 4 mmHg    AV peak gradient 9 mmHg    Ao peak caty 1.51 m/s    Ao VTI 30.90 cm   ISTAT ACT-K    Collection Time: 08/28/23  9:14 AM   Result Value Ref Range    POC ACTIVATED CLOTTING TIME K 131 74 - 137 sec    Sample unknown    POCT glucose    Collection Time: 08/28/23 10:23 PM   Result Value Ref Range    POCT Glucose 223 (H) 70 - 110 mg/dL   Basic metabolic panel    Collection Time: 08/29/23  5:07 AM   Result Value Ref Range    Sodium Level 138 136 - 145 mmol/L    Potassium Level 4.6 3.5 - 5.1 mmol/L    Chloride 112 (H) 98 - 107 mmol/L    Carbon Dioxide 21 (L) 23 - 31 mmol/L    Glucose Level 137 (H) 82 - 115 mg/dL    Blood Urea Nitrogen 41.5 (H) 9.8 - 20.1 mg/dL    Creatinine 1.52 (H) 0.55 - 1.02 mg/dL    BUN/Creatinine Ratio 27     Calcium Level Total 8.1 (L) 8.4 - 10.2 mg/dL    Anion Gap 5.0 mEq/L    eGFR 36 mls/min/1.73/m2   CBC with Differential    Collection Time: 08/29/23  5:07 AM   Result Value Ref Range    WBC 6.56 4.50 - 11.50 x10(3)/mcL    RBC 3.80 (L) 4.20 - 5.40 x10(6)/mcL    Hgb 10.9 (L) 12.0 - 16.0 g/dL    Hct 34.8 (L) 37.0 - 47.0 %    MCV 91.6 80.0 - 94.0 fL    MCH 28.7 27.0 - 31.0 pg    MCHC 31.3 (L) 33.0 - 36.0 g/dL    RDW 14.5 11.5 - 17.0 %    Platelet 110 (L) 130 - 400 x10(3)/mcL    MPV 10.2 7.4 - 10.4 fL    Neut % 62.7 %    Lymph % 20.9 %    Mono % 13.6 %    Eos % 1.8 %    Basophil % 0.5 %    Lymph # 1.37 0.6 - 4.6 x10(3)/mcL    Neut # 4.12 2.1 - 9.2 x10(3)/mcL    Mono # 0.89 0.1 - 1.3 x10(3)/mcL    Eos # 0.12 0 - 0.9 x10(3)/mcL    Baso # 0.03 <=0.2 x10(3)/mcL    IG# 0.03 0 - 0.04 x10(3)/mcL    IG% 0.5 %    NRBC% 0.0 %   Echo    Collection Time: 08/29/23  7:31 AM   Result Value Ref Range    BSA 2.08 m2    LVOT stroke volume 37.13 cm3    LVOT diameter 1.80 cm    LVOT area 2.5 cm2     LVOT peak caty 0.83 m/s    Left Ventricular Outflow Tract Mean Velocity 0.66 cm/s    Left Ventricular Outflow Tract Mean Gradient 2.00 mmHg    AV mean gradient 11 mmHg    AV peak gradient 17 mmHg    Ao peak caty 2.07 m/s    Ao VTI 39.80 cm    LVOT peak VTI 14.60 cm    AV valve area 0.93 cm²    AV Velocity Ratio 0.40     AV index (prosthetic) 0.37     GIDEON by Velocity Ratio 1.02 cm²     Telemetry:  AFIB    Physical Exam  Vitals and nursing note reviewed.   Constitutional:       Appearance: Normal appearance. She is obese.   HENT:      Head: Normocephalic.      Nose: Nose normal.      Mouth/Throat:      Mouth: Mucous membranes are moist.   Eyes:      Pupils: Pupils are equal, round, and reactive to light.   Cardiovascular:      Rate and Rhythm: Normal rate. Rhythm irregular.      Pulses: Normal pulses.      Heart sounds: Normal heart sounds. No murmur heard.  Pulmonary:      Effort: Pulmonary effort is normal.      Breath sounds: Normal breath sounds.   Abdominal:      General: Bowel sounds are normal.      Palpations: Abdomen is soft.   Musculoskeletal:         General: Normal range of motion.   Skin:     General: Skin is warm.      Comments: R Groin Soft/Flat with Dermabond, Non-Tender, No Sign of Bleed/Infection. +2 BLE Palpable Pedal Pulses    L Groin Painful to touch. Soft/Flat, Non-Tender, No Sign of Bleed/Infection. +2 BLE Palpable Pedal Pulses     Neurological:      Mental Status: She is alert and oriented to person, place, and time.   Psychiatric:         Mood and Affect: Mood normal.         Behavior: Behavior normal.       Current Inpatient Medications:  Current Facility-Administered Medications:     acetaminophen tablet 650 mg, 650 mg, Oral, Q4H PRN, Alok Coronado MD    apixaban tablet 5 mg, 5 mg, Oral, BID, Alok Coronado MD    atorvastatin tablet 40 mg, 40 mg, Oral, Daily, Alok Coronado MD, 40 mg at 08/28/23 2217    clopidogreL tablet 75 mg, 75 mg, Oral, Daily, Alok Coronado MD, 75  mg at 08/28/23 1200    dextrose 10% bolus 125 mL 125 mL, 12.5 g, Intravenous, PRN, Alok Coronado MD    dextrose 10% bolus 250 mL 250 mL, 25 g, Intravenous, PRN, Alok Coronado MD    glucagon (human recombinant) injection 1 mg, 1 mg, Intramuscular, PRN, Alok Coronado MD    glucose chewable tablet 16 g, 16 g, Oral, PRN, Alok Coronado MD    glucose chewable tablet 24 g, 24 g, Oral, PRN, Alok Coronado MD    HYDROcodone-acetaminophen 5-325 mg per tablet 1 tablet, 1 tablet, Oral, Q4H PRN, Alok Coronado MD, 1 tablet at 08/29/23 0717    insulin aspart U-100 injection 0-10 Units, 0-10 Units, Subcutaneous, QID (AC + HS) PRN, Alok Coronado MD, 2 Units at 08/28/23 2236    morphine injection 2 mg, 2 mg, Intravenous, Q4H PRN, Alok Coronado MD, 2 mg at 08/29/23 0209    ondansetron disintegrating tablet 8 mg, 8 mg, Oral, Q8H PRN, Alok Coronado MD    rOPINIRole tablet 1 mg, 1 mg, Oral, QHS, Alok Coronado MD, 1 mg at 08/28/23 2218    VTE Risk Mitigation (From admission, onward)           Ordered     apixaban tablet 5 mg  2 times daily         08/28/23 0855                  Assessment:   VHD  --Severe AS s/p TAVR (8.28.23): successful TAVR  with a 29  mm Evolute FX  LBBB  Fever of Unknown Origin   Native CAD  -- (7.31.23): Non-obstructive CAD  Proximal RCA  mid diffuse with focal 30-50% obstructions. Proximal LAD mild diffusion occlusion. Distal LAD ostial 30-50%   AFIB/AFLUT  --on Eliquis outpatient   HTN  HLD  Obesity  No known history of GI Bleed     Plan:   Ultrasound of Left Groin to rule out Pseudoaneurysm   ECHO to assess Valve  Hold Losartan and Metoprolol secondary to Marginal BP  Consult Hospital Medicine for Fever Work-up   2 week MCT Monitor at discharge s/t new LBBB    Labs in AM: CBC, BMP, and Mag    Vicki Rolly, FNP  Cardiology  [facility] - [facility] Tele[facility]  08/29/2023

## 2023-08-29 NOTE — PLAN OF CARE
Home health consult received to resume () services through [facility] upon discharge as this patient is currently active with this agency. Clinical notes and updates were sent via fax.

## 2023-08-29 NOTE — CONSULTS
[facility]  Hospital Medicine Consultation Note        Patient Name: Bria Lacy  MRN: [id number]  Patient Class: IP- Inpatient   Admission Date: 8/28/2023  5:05 AM  Length of Stay: 1  Attending Physician: Dr. Alok Coronado  Primary Care Provider: Bin Pratt Jr., MD  Face-to-Face encounter date: 08/29/2023 g  Consulting Physician: Hospital Medicine - Dr. Karly Shelley  Reason for Consult: fever  Chief Complaint: s/p CABG      Patient information was obtained from patient, patient's family, past medical records.    HISTORY OF PRESENT ILLNESS:   Bria Lacy is a 73 y.o. female with  has a past medical history of Atrial fibrillation, CHF (congestive heart failure), Depression, Diabetes mellitus, Diarrhea, Eye disease, GERD (gastroesophageal reflux disease), HLD (hyperlipidemia), Hypertension, Neuropathy, Obesity, Restless legs, Stenosis of aortic and mitral valves, TIA (transient ischemic attack), and Walker as ambulation aid.,valvular heart disease s/p TAVR; admitted to [facility] under Dr. Alok Coronado on 8/28/2023 with the diagnosis of valvular heart disease. Pt had a TAVR on 8/28/2023. PT developed fever of 100.8 F midnight this am. No reports of cough, congestion, N/V/D, abdominal pain or any urinary symptoms. Mild SOB reported. Pt did have some hypotension yesterday evening which has resolved at the time of rounds. Hospital Medicine team was consulted for workup and medical management of fever.     PAST MEDICAL HISTORY:     Past Medical History:   Diagnosis Date    Atrial fibrillation     CHF (congestive heart failure)     Depression     Diabetes mellitus     Diarrhea     Eye disease     GERD (gastroesophageal reflux disease)     HLD (hyperlipidemia)     Hypertension     Neuropathy     Obesity     Restless legs     Stenosis of aortic and mitral valves     TIA (transient ischemic attack)     Walker as ambulation aid        PAST SURGICAL HISTORY:     Past Surgical History:    Procedure Laterality Date    CATARACT EXTRACTION Bilateral     CHOLECYSTECTOMY      COLONOSCOPY      ESOPHAGOGASTRODUODENOSCOPY      EYE SURGERY Left     c stent placement    FINGER FRACTURE SURGERY Left     TONSILLECTOMY, ADENOIDECTOMY         ALLERGIES:   Patient has no known allergies.    FAMILY HISTORY:   Reviewed and negative    SOCIAL HISTORY:     Social History     Tobacco Use    Smoking status: Never    Smokeless tobacco: Never   Substance Use Topics    Alcohol use: Not Currently     Comment: rarely        HOME MEDICATIONS:   As documented  Prior to Admission medications    Medication Sig Start Date End Date Taking? Authorizing Provider   atorvastatin (LIPITOR) 40 MG tablet Take 40 mg by mouth once daily. 7/14/23  Yes Provider, Historical   beta-carotene,A,-vits C,E/mins (OCUVITE ORAL) Take 1 tablet by mouth 2 (two) times a day.   Yes Provider, Historical   brimonidine 0.2% (ALPHAGAN) 0.2 % Drop Place 1 drop into the left eye daily as needed (pain). 3/16/23  Yes Provider, Historical   cholecalciferol, vitamin D3, 125 mcg (5,000 unit) capsule Take 5,000 Units by mouth once daily.   Yes Provider, Historical   cholestyramine (QUESTRAN) 4 gram packet Take 4 g by mouth 3 (three) times daily with meals. 7/14/23  Yes Provider, Historical   clopidogreL (PLAVIX) 75 mg tablet Take 75 mg by mouth once daily. 7/14/23  Yes Provider, Historical   coenzyme Q10 (CO Q-10) 200 mg capsule Take 200 mg by mouth once daily.   Yes Provider, Historical   diltiaZEM (CARDIZEM CD) 120 MG Cp24 Take 120 mg by mouth once daily. 7/5/23  Yes Provider, Historical   dorzolamide (TRUSOPT) 2 % ophthalmic solution Place 1 drop into the right eye 2 (two) times a day.   Yes Provider, Historical   ELIQUIS 5 mg Tab Take 5 mg by mouth 2 (two) times daily. 7/14/23  Yes Provider, Historical   furosemide (LASIX) 40 MG tablet Take 40 mg by mouth once daily. 8/8/23  Yes Provider, Historical   gabapentin (NEURONTIN) 300 MG capsule Take 300 mg by mouth 3  "(three) times daily. 7/14/23  Yes Provider, Historical   hydrOXYzine HCL (ATARAX) 25 MG tablet Take 25 mg by mouth nightly. For itching 7/14/23  Yes Provider, Historical   JARDIANCE 25 mg tablet Take 25 mg by mouth once daily. 7/14/23  Yes Provider, Historical   LANTUS SOLOSTAR U-100 INSULIN glargine 100 units/mL SubQ pen Inject 30 Units into the skin every evening. 7/14/23  Yes Provider, Historical   losartan (COZAAR) 50 MG tablet Take 50 mg by mouth once daily. 6/2/23  Yes Provider, Historical   magnesium 250 mg Tab Take 1 tablet by mouth once daily.   Yes Provider, Historical   methazoLAMIDE (NEPTAZANE) 25 mg tablet Take 50 mg by mouth 2 (two) times daily. 7/7/23  Yes Provider, Historical   metoprolol succinate (TOPROL-XL) 50 MG 24 hr tablet Take 50 mg by mouth once daily. 8/8/23  Yes Provider, Historical   multivitamin (ONE DAILY MULTIVITAMIN) per tablet Take 1 tablet by mouth once daily.   Yes Provider, Historical   nepafenac (ILEVRO OPHT) Place 1 drop into the left eye as needed.   Yes Provider, Historical   pantoprazole (PROTONIX) 40 MG tablet Take 40 mg by mouth once daily. 7/14/23  Yes Provider, Historical   propylene glycol (SYSTANE COMPLETE OPHT) Apply 1-2 drops to eye as needed (dry eye).   Yes Provider, Historical   pyridoxine HCl, vitamin B6, (VITAMIN B-6 ORAL) Take 100 mg by mouth once daily.   Yes Provider, Historical   rOPINIRole (REQUIP) 1 MG tablet Take 1 mg by mouth every evening. 7/14/23  Yes Provider, Historical   sod chlor,sod bicarb/neti pot (AYR SALINE NASAL NETI RINSE LETY) by sinus irrigation route 2 (two) times daily as needed.   Yes Provider, Historical   SURE COMFORT PEN NEEDLE 31 gauge x 5/16" Ndle  5/5/23  Yes Provider, Historical   azelastine (ASTELIN) 137 mcg (0.1 %) nasal spray SMARTSIG:Both Nares 5/5/23   Provider, Historical   azithromycin (Z-JUDY) 250 MG tablet Take 250 mg by mouth. 2/28/23   Provider, Historical   erythromycin (ROMYCIN) ophthalmic ointment Place into the left " eye as needed. 6/28/23   Provider, Historical   fluticasone propionate (FLONASE) 50 mcg/actuation nasal spray by Each Nostril route. 5/5/23   Provider, Historical   magnesium oxide (MAG-OX) 400 mg (241.3 mg magnesium) tablet Take 400 mg by mouth once daily. Bottle-250 mg daily at bedtime    Provider, Historical   metoprolol succinate (TOPROL-XL) 25 MG 24 hr tablet Take 25 mg by mouth. 7/14/23   Provider, Historical       REVIEW OF SYSTEMS:   Except as documented, all other systems reviewed and negative     PHYSICAL EXAM:     VITAL SIGNS: 24 HRS MIN & MAX LAST   Temp  Min: 98 °F (36.7 °C)  Max: 100.8 °F (38.2 °C) 99.3 °F (37.4 °C)   BP  Min: 82/65  Max: 137/110 101/68   Pulse  Min: 60  Max: 115  71   Resp  Min: 10  Max: 23 18   SpO2  Min: 94 %  Max: 100 % (!) 94 %       Vitals Reviewed  General appearance: Well-developed, well-nourished obese elderly female in no apparent distress.  HENT: Atraumatic head. Moist mucous membranes of oral cavity.  Eyes: PERRL  Lungs: diminished bilaterally. No wheezing present.   Heart: Regular rate and rhythm. S1 and S2 present cap refill brisk, trace edema  Abdomen: Soft, non-distended, non-tender. No rebound tenderness/guarding. Bowel sounds are normal.   Extremities: No cyanosis, clubbing, MINOR generalized weakness  Skin: warm and dry  Neuro: AAOx3, no focal deficits  Psych/mental status: Appropriate mood and affect. Responds appropriately to questions.     LABS AND IMAGING:     Recent Labs   Lab 08/29/23  0507   WBC 6.56   RBC 3.80*   HGB 10.9*   HCT 34.8*   MCV 91.6   MCH 28.7   MCHC 31.3*   RDW 14.5   *   MPV 10.2       Recent Labs   Lab 08/28/23  0631 08/29/23  0507    138   K 5.4* 4.6   CO2 16* 21*   BUN 39.1* 41.5*   CREATININE 1.39* 1.52*   CALCIUM 8.9 8.1*   ALBUMIN 4.0  --    ALKPHOS 61  --    ALT 16  --    AST 41*  --    BILITOT 0.4  --         Microbiology Results (last 7 days)       Procedure Component Value Units Date/Time    Blood Culture [[id number]]  Collected: 08/29/23 0857    Order Status: Sent Specimen: Blood from Arm, Right Updated: 08/29/23 0915               ASSESSMENT & PLAN:   ASSESSMENT:  Acute fever- unknown etiology, suspect post-operative  Valvular heart disease- s/p TAVR 8/28/2023  Hypovolemic hypotension- suspect post-operative, resolving  MAT- POA  DM type II with hyperglycemia  PAF- controlled rate- POA  HLD- POA      Hx of CHF, depression , neuropathy, atrial fibrillation, TIA, RLS, obesity, GERD    PLAN:  IS q 2 hours while awake  CXR 2 view  Encourage TCDB  Increase mobility  Labs in the am  UA reflex to C&S  NIVA BLE  Will Follow along          VTE Prophylaxis: Already on Eliquis and Plavix    __________________________________________________________________________  INPATIENT LIST OF MEDICATIONS     Current Facility-Administered Medications:     acetaminophen tablet 650 mg, 650 mg, Oral, Q4H PRN, Alok Coronado MD    apixaban tablet 5 mg, 5 mg, Oral, BID, Alok Coronado MD, 5 mg at 08/29/23 0913    atorvastatin tablet 40 mg, 40 mg, Oral, Daily, Alok Coronado MD, 40 mg at 08/29/23 0913    clopidogreL tablet 75 mg, 75 mg, Oral, Daily, Alok Coronado MD, 75 mg at 08/29/23 0914    dextrose 10% bolus 125 mL 125 mL, 12.5 g, Intravenous, PRN, Alok Coronado MD    dextrose 10% bolus 250 mL 250 mL, 25 g, Intravenous, PRN, Alok Coronado MD    glucagon (human recombinant) injection 1 mg, 1 mg, Intramuscular, PRN, Alok Coronado MD    glucose chewable tablet 16 g, 16 g, Oral, PRN, Alok Coronado MD    glucose chewable tablet 24 g, 24 g, Oral, PRN, Alok Coronado MD    HYDROcodone-acetaminophen 5-325 mg per tablet 1 tablet, 1 tablet, Oral, Q4H PRN, Alok Coronado MD, 1 tablet at 08/29/23 0717    insulin aspart U-100 injection 0-10 Units, 0-10 Units, Subcutaneous, QID (AC + HS) PRN, Alok Coronado MD, 2 Units at 08/28/23 2236    morphine injection 2 mg, 2 mg, Intravenous, Q4H PRN, Alok Coronado  MD YI, 2 mg at 08/29/23 0209    ondansetron disintegrating tablet 8 mg, 8 mg, Oral, Q8H PRN, Alok Coronado MD    rOPINIRole tablet 1 mg, 1 mg, Oral, QHS, Alok Coronado MD, 1 mg at 08/28/23 2218      Scheduled Meds:   apixaban  5 mg Oral BID    atorvastatin  40 mg Oral Daily    clopidogreL  75 mg Oral Daily    rOPINIRole  1 mg Oral QHS     Continuous Infusions:  PRN Meds:.acetaminophen, dextrose 10%, dextrose 10%, glucagon (human recombinant), glucose, glucose, HYDROcodone-acetaminophen, insulin aspart U-100, morphine, ondansetron    RADIOLOGY                                                                                                    Echo    Limited echo during TAVR procedure.    Well seated self-expaning TAVR valve. No significant paravalvular leak.   Mean gradient 3 mmHg. No pericardial effusion.  Cardiac catheterization    The estimated blood loss was none.    The procedure log was documented by Documenter: Martina Chaney RN and   verified by Alok Coronado MD.    Date: 8/28/2023  Time: 8:46 AM    Preprocedure diagnosis-critical aortic stenosis  Postprocedure diagnosis-Successful TAVR  Estimated blood loss-15 cc   Tissue removal-none  Complications-None    Procedures performed:  1. TAVR  29  mm Evolute FX,  Right TF approach  2. Limited echo pre, post valve placement  3. Root angiography  4. Distal aortography  5. Temporary pacemaker placement removal  6. U/S guided bilateral groin access   7. Perclose right CFA with additional 6 Albanian angioseal  8. Angioseal closure left CFA    Findings:  1. Root angiography post valve placement well-seated valve, no aortic   insufficiency  2. Limited echo post valve placement 4 mmHG mean gradient , no aortic   insufficiency.  Invasive gradient 13 mmHG.  3. Distal aortography post groin closure, no bleeding or dissection at   access site.     Procedure:  Ultrasound guided bilateral groin access performed sheaths inserted.    Femoral angiography performed.   Heparin administered.  Preclosure   perfromed in Right CFA access site.  14 Panamanian cook sheath placed in right   CFA. Pigtail advanced via Left  groin.  Root angiography performed.    Temporary pacemaker advanced into RV and adequate thresholds obtained.     AL1 catheter and Glidewire were used to cross the aortic valve and changed   out for a safari wire.  The 14 Panamanian sheath was exchanged out for the   inline corevalve delivery system.  Valve was advanced across the native   aortic valve.  Positioning was performed with  ventricular pacing at 120   bpm, the valve was slowly deployed.  Interrogation with angiography and   limited echo was satisfactory.  Valve was then fully deployed. Inline   delivery system was removed and a second pigtail was advanced into the LV   for a valve study.  Catheter was removed and a closure device was   deployed, partial hemostasis was obtained and an angeoseal was placed in   the Right CFA access site.  Aortography  demonstrated no bleeding.    Pigtail removed, Pacemaker was removed and venous sheath left in place. A   second angioseal was used for left groin access site hemostasis.    Plan:  1. Continue plavix and eliquis (patient was on these meds pre procedure)  2. Echo in a.m.  3. EKG on arrival to floor  4. Manual pressure for venous access site hemostasis           I,  Nash Baxter [facility] ,have reviewed and discussed the case with Dr. Karly Shelley.   Please see the following addendum for further assessment and plan from there attending MD.  08/29/2023      Nash Baxter [facility] Medicine Team  08/29/2023     MD Addendum:  I, Dr. SHANTA Shelley, assumed care of this patient today   For the patient encounter, I performed the substantive portion of the visit, I reviewed the NP/PA documentation, treatment plan, and medical decision making.  I had face to face time with this patient     A. History :Pt presented to [facility] for a scheduled TAVR on 8.28.23. She  underwent a successful TAVR  .  Hospital Medicine was consulted for fever    B. Physical exam:  Agree with the physical exam as above, alert, obese, diminished lung sounds, regular rhythm, abdomen soft and nontender, painful groin    C. Medical decision making:  Consulted for fever of unknown origin    Agree with the above plan, recommend follow up on blood culture, follow up on chest x-ray two view, send urine cultures , rule out DVTs, follow up on respiratory panel.  Monitor for any signs of cellulitis.  Obtain stool cultures, GI panel if diarrhea.  Monitor fever curve and WBC.  Patient without any white count, would follow up above workup and decide on antimicrobials.    Encourage incentive spirometry.  Continue appropriate home medications    Thank you for the consult    All diagnosis and differential diagnosis have been reviewed; assessment and plan has been documented; I have personally reviewed the labs and test results that are presently available; I have reviewed the patients medication list; I have reviewed the consulting providers response and recommendations. I have reviewed or attempted to review medical records based upon their availability.    All of the patient and family questions have been addressed and answered. Patient's is agreeable to the above stated plan. I will continue to monitor closely and make adjustments to medical management as needed.      Karly Shelley MD

## 2023-08-30 LAB
ANION GAP SERPL CALC-SCNC: 9 MEQ/L
BASOPHILS # BLD AUTO: 0.05 X10(3)/MCL
BASOPHILS NFR BLD AUTO: 0.7 %
BUN SERPL-MCNC: 40.6 MG/DL (ref 9.8–20.1)
CALCIUM SERPL-MCNC: 8 MG/DL (ref 8.4–10.2)
CHLORIDE SERPL-SCNC: 111 MMOL/L (ref 98–107)
CO2 SERPL-SCNC: 18 MMOL/L (ref 23–31)
CREAT SERPL-MCNC: 1.3 MG/DL (ref 0.55–1.02)
CREAT/UREA NIT SERPL: 31
EOSINOPHIL # BLD AUTO: 0.16 X10(3)/MCL (ref 0–0.9)
EOSINOPHIL NFR BLD AUTO: 2.3 %
ERYTHROCYTE [DISTWIDTH] IN BLOOD BY AUTOMATED COUNT: 14.3 % (ref 11.5–17)
FLUAV AG UPPER RESP QL IA.RAPID: NOT DETECTED
FLUBV AG UPPER RESP QL IA.RAPID: NOT DETECTED
GFR SERPLBLD CREATININE-BSD FMLA CKD-EPI: 43 MLS/MIN/1.73/M2
GLUCOSE SERPL-MCNC: 140 MG/DL (ref 82–115)
HCT VFR BLD AUTO: 33.6 % (ref 37–47)
HGB BLD-MCNC: 10.4 G/DL (ref 12–16)
IMM GRANULOCYTES # BLD AUTO: 0.04 X10(3)/MCL (ref 0–0.04)
IMM GRANULOCYTES NFR BLD AUTO: 0.6 %
LYMPHOCYTES # BLD AUTO: 1.43 X10(3)/MCL (ref 0.6–4.6)
LYMPHOCYTES NFR BLD AUTO: 20.6 %
MAGNESIUM SERPL-MCNC: 2 MG/DL (ref 1.6–2.6)
MCH RBC QN AUTO: 28.3 PG (ref 27–31)
MCHC RBC AUTO-ENTMCNC: 31 G/DL (ref 33–36)
MCV RBC AUTO: 91.3 FL (ref 80–94)
MONOCYTES # BLD AUTO: 1 X10(3)/MCL (ref 0.1–1.3)
MONOCYTES NFR BLD AUTO: 14.4 %
NEUTROPHILS # BLD AUTO: 4.26 X10(3)/MCL (ref 2.1–9.2)
NEUTROPHILS NFR BLD AUTO: 61.4 %
NRBC BLD AUTO-RTO: 0 %
PLATELET # BLD AUTO: 103 X10(3)/MCL (ref 130–400)
PMV BLD AUTO: 10.3 FL (ref 7.4–10.4)
POCT GLUCOSE: 151 MG/DL (ref 70–110)
POCT GLUCOSE: 170 MG/DL (ref 70–110)
POTASSIUM SERPL-SCNC: 4.3 MMOL/L (ref 3.5–5.1)
RBC # BLD AUTO: 3.68 X10(6)/MCL (ref 4.2–5.4)
SARS-COV-2 RNA RESP QL NAA+PROBE: NOT DETECTED
SODIUM SERPL-SCNC: 138 MMOL/L (ref 136–145)
WBC # SPEC AUTO: 6.94 X10(3)/MCL (ref 4.5–11.5)

## 2023-08-30 PROCEDURE — 83735 ASSAY OF MAGNESIUM: CPT

## 2023-08-30 PROCEDURE — 25000003 PHARM REV CODE 250: Performed by: INTERNAL MEDICINE

## 2023-08-30 PROCEDURE — 21400001 HC TELEMETRY ROOM

## 2023-08-30 PROCEDURE — 80048 BASIC METABOLIC PNL TOTAL CA: CPT

## 2023-08-30 PROCEDURE — 97166 OT EVAL MOD COMPLEX 45 MIN: CPT

## 2023-08-30 PROCEDURE — 85025 COMPLETE CBC W/AUTO DIFF WBC: CPT

## 2023-08-30 PROCEDURE — 0240U COVID/FLU A&B PCR: CPT | Performed by: STUDENT IN AN ORGANIZED HEALTH CARE EDUCATION/TRAINING PROGRAM

## 2023-08-30 PROCEDURE — 97162 PT EVAL MOD COMPLEX 30 MIN: CPT

## 2023-08-30 PROCEDURE — 25000003 PHARM REV CODE 250: Performed by: NURSE PRACTITIONER

## 2023-08-30 PROCEDURE — 25000003 PHARM REV CODE 250: Performed by: STUDENT IN AN ORGANIZED HEALTH CARE EDUCATION/TRAINING PROGRAM

## 2023-08-30 PROCEDURE — 25000003 PHARM REV CODE 250

## 2023-08-30 RX ORDER — BISACODYL 10 MG
10 SUPPOSITORY, RECTAL RECTAL DAILY
Status: DISCONTINUED | OUTPATIENT
Start: 2023-08-30 | End: 2023-08-31 | Stop reason: HOSPADM

## 2023-08-30 RX ORDER — LOSARTAN POTASSIUM 50 MG/1
50 TABLET ORAL DAILY
Status: DISCONTINUED | OUTPATIENT
Start: 2023-08-30 | End: 2023-08-31 | Stop reason: HOSPADM

## 2023-08-30 RX ORDER — POLYETHYLENE GLYCOL 3350 17 G/17G
17 POWDER, FOR SOLUTION ORAL 2 TIMES DAILY
Status: DISCONTINUED | OUTPATIENT
Start: 2023-08-30 | End: 2023-08-31 | Stop reason: HOSPADM

## 2023-08-30 RX ORDER — ALPRAZOLAM 0.25 MG/1
0.25 TABLET ORAL ONCE
Status: COMPLETED | OUTPATIENT
Start: 2023-08-30 | End: 2023-08-30

## 2023-08-30 RX ADMIN — BISACODYL 10 MG: 10 SUPPOSITORY RECTAL at 03:08

## 2023-08-30 RX ADMIN — CLOPIDOGREL BISULFATE 75 MG: 75 TABLET ORAL at 10:08

## 2023-08-30 RX ADMIN — ATORVASTATIN CALCIUM 40 MG: 40 TABLET, FILM COATED ORAL at 10:08

## 2023-08-30 RX ADMIN — APIXABAN 5 MG: 5 TABLET, FILM COATED ORAL at 08:08

## 2023-08-30 RX ADMIN — ROPINIROLE HYDROCHLORIDE 1 MG: 1 TABLET, FILM COATED ORAL at 08:08

## 2023-08-30 RX ADMIN — APIXABAN 5 MG: 5 TABLET, FILM COATED ORAL at 10:08

## 2023-08-30 RX ADMIN — POLYETHYLENE GLYCOL 3350 17 G: 17 POWDER, FOR SOLUTION ORAL at 03:08

## 2023-08-30 RX ADMIN — LOSARTAN POTASSIUM 50 MG: 50 TABLET, FILM COATED ORAL at 10:08

## 2023-08-30 RX ADMIN — ALPRAZOLAM 0.25 MG: 0.25 TABLET ORAL at 10:08

## 2023-08-30 RX ADMIN — POLYETHYLENE GLYCOL 3350 17 G: 17 POWDER, FOR SOLUTION ORAL at 08:08

## 2023-08-30 NOTE — PLAN OF CARE
Problem: Physical Therapy  Goal: Physical Therapy Goal  Description: Goals to be met by: 23     Patient will increase functional independence with mobility by performin. Supine to sit with [place]  2. Sit to supine with [place]  3. Sit to stand transfer with Modified [place]  4. Gait  x 300 feet with Modified [place] using Rolling Walker.   5. Ascend/descend 5 stairs with bilateral Handrails & Modified [place]    Outcome: Ongoing, Progressing

## 2023-08-30 NOTE — PT/OT/SLP EVAL
Occupational Therapy  Evaluation    Name: Bria Lacy  MRN: [id number]  Admitting Diagnosis: Critical aortic stenosis, s/p TAVR (8/28)  Recent Surgery: Procedure(s) (LRB):  REPLACEMENT, AORTIC VALVE, TRANSCATHETER (TAVR) (N/A) 2 Days Post-Op    Recommendations:     Discharge Recommendations: home with home health  Discharge Equipment Recommendations:  other (see comments) (pending progress)  Barriers to discharge:  Other (Comment) (Ongoing medical needs and mild decline in functional independence)    Assessment:     Bria Lacy is a 73 y.o. female with a medical diagnosis of Critical aortic stenosis, s/p TAVR (8/28). 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  She presents with the following performance deficits affecting function: weakness, impaired endurance, impaired self care skills, impaired functional mobility, impaired balance, gait instability, visual deficits, decreased safety awareness. She tolerated today's evaluation fair, however her participation was limited by GI discomfort. She required generally Min A with use of RW for OOB functional mobility. All ADLs assessed today were performed in a seated position, with varying levels of assist required. CGA provided for toileting tasks and Max A provided for LB dressing. She would benefit from home health therapy services upon discharge to maximize her functional independence and safety upon returning home.     Rehab Prognosis: Good; patient would benefit from acute skilled OT services to address these deficits and reach maximum level of function.       Plan:     Patient to be seen 5 x/week to address the above listed problems via self-care/home management, therapeutic activities, therapeutic exercises  Plan of Care Expires: 09/13/23  Plan of Care Reviewed with: patient, son    Subjective     Chief Complaint: GI discomfort  Patient/Family Comments/goals: To return home    Occupational Profile:  Living Environment: Pt  lives with her son and daughter in law in a two-story home, however she is able to live on the first floor only. She has access to walk-in shower.   Previous level of function: Mod I with use of a RW for functional mobility.   Roles and Routines: Basic self-care tasks.   Equipment Used at Home: rollator, shower chair  Assistance upon Discharge: Family    Pain/Comfort:  Pain Rating 1: 0/10    Patients cultural, spiritual, Protestant conflicts given the current situation: no    Objective:     Communicated with: Nsg prior to session.  Patient found HOB elevated with PureWick, peripheral IV, telemetry upon OT entry to room.    General Precautions: Standard, fall  Braces: N/A  Respiratory Status: Room air  Vital Signs: Blood Pressure: 154/75  HR: 75-90s  Sp02: 93%    Occupational Performance:    Bed Mobility:    Patient completed Supine to Sit with minimum assistance  Patient completed Sit to Supine with contact guard assistance    Functional Mobility/Transfers:  Patient completed Sit <> Stand Transfer with minimum assistance  with  rolling walker   Patient completed Toilet Transfer Step Transfer technique with minimum assistance with  rolling walker and bedside commode. Pt with urinary incontinence during transfer.   Functional Mobility: Limited to a few steps at the bedside for BSC transfer due to GI discomfort. No LOB observed, however decreased foot clearance appreciated.     Activities of Daily Living:  Lower Body Dressing: maximal assistance to doff/don BL socks while seated on BSC.   Toileting: contact guard assistance for pericare while seated on BSC.    Cognitive/Visual Perceptual:  Cognitive/Psychosocial Skills:     -       Oriented to: Person, Place, Time, and Situation   -       Follows Commands/attention:Follows multistep  commands  -       Safety awareness/insight to disability: Mildly impaired, requiring verbal cueing for safe body mechanics and education regarding fall prevention   Visual/Perceptual:       -Impaired  acuity bilaterally (L>R); present at baseline.    Physical Exam:  Upper Extremity Range of Motion:     -       Right Upper Extremity: WFL  -       Left Upper Extremity: WFL  Upper Extremity Strength:    -       Right Upper Extremity: WFL  -       Left Upper Extremity: WFL    Therapeutic Positioning  Risk for acquired pressure injuries is increased due to incontinence.    OT interventions performed during the course of today's session in an effort to prevent and/or reduce acquired pressure injuries:   Therapeutic positioning completed     OT recommendations for therapeutic positioning throughout hospitalization:   Follow [facility] Pressure Injury Prevention Protocol    Patient Education:  Patient and son/s provided with verbal education regarding OT role/goals/POC, fall prevention, and safety awareness.  Understanding was verbalized, however additional teaching warranted.     Patient left HOB elevated with all lines intact, call button in reach, and nsg notified. Nurse aware of GI discomfort and planning to address shortly following today's evaluation.     GOALS:   Multidisciplinary Problems       Occupational Therapy Goals          Problem: Occupational Therapy    Goal Priority Disciplines Outcome Interventions   Occupational Therapy Goal     OT, PT/OT Ongoing, Progressing    Description: Goals to be met by: Discharge     Patient will increase functional independence with ADLs by performing:    LE Dressing with Modified [place].  Grooming while standing at sink with Modified [place].  Toileting from toilet with Modified [place] for hygiene and clothing management.   Bathing from  shower chair/bench with Modified [place].  Toilet transfer to toilet with Modified [place].                         History:     Past Medical History:   Diagnosis Date    Atrial fibrillation     CHF (congestive heart failure)     Depression     Diabetes mellitus     Diarrhea     Eye disease     GERD  (gastroesophageal reflux disease)     HLD (hyperlipidemia)     Hypertension     Neuropathy     Obesity     Restless legs     Stenosis of aortic and mitral valves     TIA (transient ischemic attack)     Walker as ambulation aid          Past Surgical History:   Procedure Laterality Date    CATARACT EXTRACTION Bilateral     CHOLECYSTECTOMY      COLONOSCOPY      ESOPHAGOGASTRODUODENOSCOPY      EYE SURGERY Left     c stent placement    FINGER FRACTURE SURGERY Left     TONSILLECTOMY, ADENOIDECTOMY      TRANSCATHETER AORTIC VALVE REPLACEMENT (TAVR) N/A 8/28/2023    Procedure: REPLACEMENT, AORTIC VALVE, TRANSCATHETER (TAVR);  Surgeon: Alok Coronado MD;  Location: [facility] CATH LAB;  Service: Cardiology;  Laterality: N/A;  TAVR W/ ANEST.       Time Tracking:     OT Date of Treatment: 08/30/23  OT Start Time: 1511  OT Stop Time: 1535  OT Total Time (min): 24 min    Billable Minutes:Evaluation (Moderate Complexity)    8/30/2023

## 2023-08-30 NOTE — PLAN OF CARE
Problem: Occupational Therapy  Goal: Occupational Therapy Goal  Description: Goals to be met by: Discharge     Patient will increase functional independence with ADLs by performing:    LE Dressing with Modified [place].  Grooming while standing at sink with Modified [place].  Toileting from toilet with Modified [place] for hygiene and clothing management.   Bathing from  shower chair/bench with Modified [place].  Toilet transfer to toilet with Modified [place].    Outcome: Ongoing, Progressing

## 2023-08-30 NOTE — PT/OT/SLP EVAL
Physical Therapy Evaluation    Patient Name:  Bria Lacy   MRN:  [id number]    Recommendations:     Discharge Recommendations: home with home health, home health PT   Discharge Equipment Recommendations: none   Barriers to discharge: Ongoing medical needs    Assessment:     Bria Lacy is a 73 y.o. female admitted with aortic stenosis. S/p TAVR 8/28. Patient reports living with son in [facility] with 5 steps (bilateral rails) to enter. At baseline, she is independent and ambulates with a rollator. 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 She required MIN A for bed mobility. Ambulated 17 ft with RW & CGA-MIN A. Assistance with steering RW. Patient would benefit from continued PT services while in hospital to improve functional mobility & return to PLOF. She will need HH PT at discharge. Progress patient as tolerated.     Rehab Prognosis: Good; patient would benefit from acute skilled PT services to address these deficits and reach maximum level of function.    Recent Surgery: Procedure(s) (LRB):  REPLACEMENT, AORTIC VALVE, TRANSCATHETER (TAVR) (N/A) 2 Days Post-Op    Plan:     During this hospitalization, patient to be seen 5 x/week to address the identified rehab impairments via gait training, therapeutic activities, therapeutic exercises, neuromuscular re-education and progress toward the following goals:    Plan of Care Expires:  09/30/23    Subjective     Chief Complaint: none  Patient/Family Comments/goals: none  Pain/Comfort:  Pain Rating 1: 0/10    Patients cultural, spiritual, Congregation conflicts given the current situation: no    Living Environment:  Patient reports living with son in [facility] with 5 steps (bilateral rails) to enter.  Prior to admission, patients level of  function was independent.  Equipment used at home: rollator, walker, rolling, shower chair.  DME owned (not currently used): none.  Upon discharge, patient will have assistance from son.    Objective:     Communicated with nurse prior to session.  Patient found supine with telemetry, [employer]  upon PT entry to room.    General Precautions: Standard, fall, blind  Orthopedic Precautions:N/A   Braces: N/A  Respiratory Status: Room air      Exams:  Cognitive Exam:  Patient is oriented to Person, Place, Time, and Situation  Sensation: -       Intact  RLE ROM: WFL  RLE Strength: 4/5 grossly  LLE ROM: WFL  LLE Strength: 4/5 grossly  Skin integrity: Visible skin intact      Functional Mobility:  Bed Mobility:     Supine to Sit: minimum assistance  Transfers:  Sit to Stand:  contact guard assistance with rolling walker  Gait: 17 ft with RW & CGA-MIN A. Assistance with steering RW.  Balance: fair      AM-PAC 6 CLICK MOBILITY  Total Score:18     Education Provided:  Role and goals of PT, transfer training, bed mobility, gait training, balance training, safety awareness, assistive device, strengthening exercises, and importance of participating in PT to return to PLOF.    Patient left up in chair with all lines intact, call button in reach, and educated on calling for assistance when ready to return to bed .    GOALS:   Multidisciplinary Problems       Physical Therapy Goals          Problem: Physical Therapy    Goal Priority Disciplines Outcome Goal Variances Interventions   Physical Therapy Goal     PT, PT/OT Ongoing, Progressing     Description: Goals to be met by: 23     Patient will increase functional independence with mobility by performin. Supine to sit with [place]  2. Sit to supine with [place]  3. Sit to stand transfer with Modified [place]  4. Gait  x 300 feet with Modified [place] using Rolling Walker.   5. Ascend/descend 5 stairs with bilateral Handrails & Modified [place]                          History:     Past Medical History:   Diagnosis Date    Atrial fibrillation     CHF (congestive heart failure)     Depression     Diabetes mellitus     Diarrhea     Eye disease     GERD (gastroesophageal reflux disease)     HLD (hyperlipidemia)     Hypertension     Neuropathy     Obesity     Restless legs     Stenosis of aortic and mitral valves     TIA (transient ischemic attack)     Walker as ambulation aid        Past Surgical History:   Procedure Laterality Date    CATARACT EXTRACTION Bilateral     CHOLECYSTECTOMY      COLONOSCOPY      ESOPHAGOGASTRODUODENOSCOPY      EYE SURGERY Left     c stent placement    FINGER FRACTURE SURGERY Left     TONSILLECTOMY, ADENOIDECTOMY      TRANSCATHETER AORTIC VALVE REPLACEMENT (TAVR) N/A 8/28/2023    Procedure: REPLACEMENT, AORTIC VALVE, TRANSCATHETER (TAVR);  Surgeon: Alok Coronado MD;  Location: [facility] CATH LAB;  Service: Cardiology;  Laterality: N/A;  TAVR W/ ANEST.       Time Tracking:     PT Received On: 08/30/23  PT Start Time: 1244     PT Stop Time: 1304  PT Total Time (min): 20 min     Billable Minutes: Evaluation 20 minutes      08/30/2023

## 2023-08-30 NOTE — PROGRESS NOTES
[facility] Medical Telemetry    Cardiology  Progress Note    Patient Name: Bria Lacy  MRN: [id number]  Admission Date: 8/28/2023  Hospital Length of Stay: 2 days  Code Status: No Order   Attending Physician: Alok Coronado MD   Primary Care Physician: Bin Pratt Jr., MD  Expected Discharge Date:   Principal Problem:<principal problem not specified>    Subjective:     Brief HPI:   Ms. Lacy is a 72 y/o female with a history of Native CAD, VHD s/p TAVR, AFIB/AFLUT, HTN, HLD, who is known to [facility], Dr. Figueroa/Cliff. She presented to [facility] for a scheduled TAVR on 8.28.23. She underwent a successful TAVR  with a 29  mm Evolute FX.     Hospital Course:   8.29.23: NAD. Marginal BP. Fever 100.4 last night.   8.30.23: NAD. VSS. Low-grade fever with chills. No complaints of CP/SOB/Palps    PMH: Native CAD, VHD, AFIB/AFLUT HTN, HLD, Obesity  PSH: Finger Surgery, Hysterectomy, Tonsillectomy, Appendectomy, Cholecystectomy  Family History: Father - Cancer  Social History: Denies smoking, illicit drug use, and alcohol use    Previous Cardiac Diagnostics:   Limited ECHO (8.29.23):  Aortic Valve: There is a transcatheter valve replacement in the aortic position.Peak AV 2.3 m/sec. Mitral annular calcification is present. No significant pericardial effusion or tamponade physiology.    Bilateral Venous US (8.29.23):  There was no evidence of deep or superficial vein thrombosis in the bilateral lower extremities.    CV Pseudoanerysm Eval (8.29.23):  There is no evidence of pseudoaneurysm/hematoma identified in the groin of the left lower extremity.   The posterior tibial and dorsalis pedis arteries are patent.     TAVR (8.28.23):   successful TAVR  with a 29  mm Evolute FX    Limited ECHO (8.28.23):  Limited echo during TAVR procedure.  Well seated self-expaning TAVR valve. No significant paravalvular leak. Mean gradient 3 mmHg. No pericardial effusion.    Calcium Score (7.27.23): 2267    [facility]  (7.31.23):  Non-obstructive CAD  Proximal RCA  mid diffuse with focal 30-50% obstructions. Proximal LAD mild diffusion occlusion. Distal LAD ostial 30-50%     ECHO (5.18.23):  The left ventricle is decreased in size. Global left ventricular systolic function is borderline normal. The left ventricular ejection fraction is 50%. Left ventricular diastolic function is indeterminate. Noted left ventricular hypertrophy. Concentric left ventricular hypertrophy is present. It is moderate. Moderate aortic valve stenosis is present. The aortic valve area planimetry is 1.1 cm². Aortic valve area continuity equation is 1.1 cm². The trans-aortic peak velocity is 3.7 m/s. The trans-aortic peak gradient is 55.5 mmHg. The trans-aortic mean gradient is 31.2 mmHg. DI-0.56. Moderate mitral annular calcification is noted. The estimated pulmonary artery systolic pressure is 27 mmHg assuming a right atrial pressure of 8 mmHg.     CTA Chest (5.18.23):  Negative for PE    Carotid US (4.27.23):  Bilateral plaque formation in the carotid bulbs and proximal internal carotid arteries bilaterally, greater on the right. There is also elevation of peak velocity in the right internal carotid artery indicating moderate stenosis by velocity criteria.      Review of Systems   Constitutional: Positive for chills and fever.   Cardiovascular:  Negative for chest pain, dyspnea on exertion, leg swelling and palpitations.   Respiratory:  Negative for shortness of breath.    All other systems reviewed and are negative.      Objective:     Vital Signs (Most Recent):  Temp: 99.2 °F (37.3 °C) (08/30/23 0802)  Pulse: 72 (08/30/23 0802)  Resp: 18 (08/30/23 0802)  BP: (!) 133/55 (08/30/23 0802)  SpO2: (!) 93 % (08/30/23 0802) Vital Signs (24h Range):  Temp:  [98.1 °F (36.7 °C)-100 °F (37.8 °C)] 99.2 °F (37.3 °C)  Pulse:  [] 72  Resp:  [18] 18  SpO2:  [92 %-96 %] 93 %  BP: ()/(45-78) 133/55     Weight: 92.7 kg (204 lb 5.9 oz)  Body mass index is 32.99  kg/m².    SpO2: (!) 93 %       Intake/Output Summary (Last 24 hours) at 8/30/2023 0840  Last data filed at 8/29/2023 1400  Gross per 24 hour   Intake 480 ml   Output 500 ml   Net -20 ml         Lines/Drains/Airways       Epidural Line  Duration                  Perineural Analgesia/Anesthesia Assessment (Motor Function-Bromage) 08/28/23 0744 2 days              Peripheral Intravenous Line  Duration                  Peripheral IV - Single Lumen 08/28/23 0530 20 G Anterior;Right Upper Arm 2 days         Peripheral IV - Single Lumen 08/28/23 0545 20 G Anterior;Right Forearm 2 days                  Significant Labs:   Recent Results (from the past 72 hour(s))   POCT glucose    Collection Time: 08/28/23  6:08 AM   Result Value Ref Range    POCT Glucose 157 (H) 70 - 110 mg/dL   Protime-INR    Collection Time: 08/28/23  6:25 AM   Result Value Ref Range    PT 14.5 12.5 - 14.5 seconds    INR 1.1 <=1.3   Type & Screen    Collection Time: 08/28/23  6:27 AM   Result Value Ref Range    Group & Rh O POS     Indirect Tani GEL NEG     Specimen Outdate 08/31/2023 23:59    Prepare RBC 2 Units; surgical procedure    Collection Time: 08/28/23  6:27 AM   Result Value Ref Range    UNIT NUMBER [id number]     UNIT ABO/RH O POS     DISPENSE STATUS Selected     Unit Expiration [id number]     Product Code J6807P57     Unit Blood Type Code 5100     CROSSMATCH INTERPRETATION Compatible     UNIT NUMBER [id number]     UNIT ABO/RH O POS     DISPENSE STATUS Selected     Unit Expiration [id number]     Product Code G0321O15     Unit Blood Type Code 5100     CROSSMATCH INTERPRETATION Compatible    NT-Pro Natriuretic Peptide    Collection Time: 08/28/23  6:31 AM   Result Value Ref Range    NT-Pro BNP, S 3856 (H) <=540 pg/mL   Comprehensive Metabolic Panel    Collection Time: 08/28/23  6:31 AM   Result Value Ref Range    Sodium Level 143 136 - 145 mmol/L    Potassium Level 5.4 (H) 3.5 - 5.1 mmol/L    Chloride 116 (H) 98 - 107 mmol/L     Carbon Dioxide 16 (L) 23 - 31 mmol/L    Glucose Level 159 (H) 82 - 115 mg/dL    Blood Urea Nitrogen 39.1 (H) 9.8 - 20.1 mg/dL    Creatinine 1.39 (H) 0.55 - 1.02 mg/dL    Calcium Level Total 8.9 8.4 - 10.2 mg/dL    Protein Total 6.8 5.8 - 7.6 gm/dL    Albumin Level 4.0 3.4 - 4.8 g/dL    Globulin 2.8 2.4 - 3.5 gm/dL    Albumin/Globulin Ratio 1.4 1.1 - 2.0 ratio    Bilirubin Total 0.4 <=1.5 mg/dL    Alkaline Phosphatase 61 40 - 150 unit/L    Alanine Aminotransferase 16 0 - 55 unit/L    Aspartate Aminotransferase 41 (H) 5 - 34 unit/L    eGFR 40 mls/min/1.73/m2   MRSA PCR    Collection Time: 08/28/23  6:45 AM   Result Value Ref Range    MRSA PCR SCRN ([facility]) Not Detected Not Detected   POCT glucose    Collection Time: 08/28/23  9:02 AM   Result Value Ref Range    POCT Glucose 147 (H) 70 - 110 mg/dL   Echo    Collection Time: 08/28/23  9:10 AM   Result Value Ref Range    BSA 2.08 m2    AV mean gradient 4 mmHg    AV peak gradient 9 mmHg    Ao peak caty 1.51 m/s    Ao VTI 30.90 cm   ISTAT ACT-K    Collection Time: 08/28/23  9:14 AM   Result Value Ref Range    POC ACTIVATED CLOTTING TIME K 131 74 - 137 sec    Sample unknown    POCT glucose    Collection Time: 08/28/23 10:23 PM   Result Value Ref Range    POCT Glucose 223 (H) 70 - 110 mg/dL   Basic metabolic panel    Collection Time: 08/29/23  5:07 AM   Result Value Ref Range    Sodium Level 138 136 - 145 mmol/L    Potassium Level 4.6 3.5 - 5.1 mmol/L    Chloride 112 (H) 98 - 107 mmol/L    Carbon Dioxide 21 (L) 23 - 31 mmol/L    Glucose Level 137 (H) 82 - 115 mg/dL    Blood Urea Nitrogen 41.5 (H) 9.8 - 20.1 mg/dL    Creatinine 1.52 (H) 0.55 - 1.02 mg/dL    BUN/Creatinine Ratio 27     Calcium Level Total 8.1 (L) 8.4 - 10.2 mg/dL    Anion Gap 5.0 mEq/L    eGFR 36 mls/min/1.73/m2   CBC with Differential    Collection Time: 08/29/23  5:07 AM   Result Value Ref Range    WBC 6.56 4.50 - 11.50 x10(3)/mcL    RBC 3.80 (L) 4.20 - 5.40 x10(6)/mcL    Hgb 10.9 (L) 12.0 - 16.0 g/dL    Hct  34.8 (L) 37.0 - 47.0 %    MCV 91.6 80.0 - 94.0 fL    MCH 28.7 27.0 - 31.0 pg    MCHC 31.3 (L) 33.0 - 36.0 g/dL    RDW 14.5 11.5 - 17.0 %    Platelet 110 (L) 130 - 400 x10(3)/mcL    MPV 10.2 7.4 - 10.4 fL    Neut % 62.7 %    Lymph % 20.9 %    Mono % 13.6 %    Eos % 1.8 %    Basophil % 0.5 %    Lymph # 1.37 0.6 - 4.6 x10(3)/mcL    Neut # 4.12 2.1 - 9.2 x10(3)/mcL    Mono # 0.89 0.1 - 1.3 x10(3)/mcL    Eos # 0.12 0 - 0.9 x10(3)/mcL    Baso # 0.03 <=0.2 x10(3)/mcL    IG# 0.03 0 - 0.04 x10(3)/mcL    IG% 0.5 %    NRBC% 0.0 %   Echo    Collection Time: 08/29/23  7:31 AM   Result Value Ref Range    BSA 2.08 m2    LVOT stroke volume 37.13 cm3    LVOT diameter 1.80 cm    LVOT area 2.5 cm2    LVOT peak caty 0.83 m/s    Left Ventricular Outflow Tract Mean Velocity 0.66 cm/s    Left Ventricular Outflow Tract Mean Gradient 2.00 mmHg    AV mean gradient 11 mmHg    AV peak gradient 17 mmHg    Ao peak caty 2.07 m/s    Ao VTI 39.80 cm    LVOT peak VTI 14.60 cm    AV valve area 0.93 cm²    AV Velocity Ratio 0.40     AV index (prosthetic) 0.37     GIDEON by Velocity Ratio 1.02 cm²   POCT glucose    Collection Time: 08/29/23 11:53 AM   Result Value Ref Range    POCT Glucose 176 (H) 70 - 110 mg/dL   Urinalysis, Reflex to Urine Culture    Collection Time: 08/29/23  6:28 PM    Specimen: Urine   Result Value Ref Range    Color, UA Yellow Yellow, Light-Yellow, Dark Yellow, Diane, Straw    Appearance, UA Cloudy (A) Clear    Specific Gravity, UA 1.026 1.005 - 1.030    pH, UA 5.5 5.0 - 8.5    Protein, UA Trace (A) Negative    Glucose, UA 3+ (A) Negative, Normal    Ketones, UA Negative Negative    Blood, UA Negative Negative    Bilirubin, UA Negative Negative    Urobilinogen, UA 0.2 0.2, 1.0, Normal    Nitrites, UA Negative Negative    Leukocyte Esterase, UA Trace (A) Negative   Respiratory Panel    Collection Time: 08/29/23  6:28 PM   Result Value Ref Range    Adenovirus Not Detected Not Detected    Coronavirus 229E Not Detected Not Detected     Coronavirus HKU1 Not Detected Not Detected    Coronavirus NL63 Not Detected Not Detected    Coronavirus OC43 PCR, Common Cold Virus Not Detected Not Detected    Human Metapneumovirus Not Detected Not Detected    Parainfluenza Virus 1 Not Detected Not Detected    Parainfluenza Virus 2 Not Detected Not Detected    Parainfluenza Virus 3 Not Detected Not Detected    Parainfluenza Virus 4 Not Detected Not Detected    Bordetella pertussis (ptxP) Not Detected Not Detected    Chlamydia pneumoniae Not Detected Not Detected    Mycoplasma pneumoniae Not Detected Not Detected    Human Rhinovirus/Enterovirus Not Detected Not Detected    Bordetella parapertussis (AQ0513) Not Detected Not Detected   Urinalysis, Microscopic    Collection Time: 08/29/23  6:28 PM   Result Value Ref Range    RBC, UA <5 <=5 /HPF    WBC, UA <5 <=5 /HPF    Squamous Epithelial Cells, UA 12 (H) <=5 /HPF    Bacteria, UA None Seen None Seen, Rare, Occasional /HPF   POCT glucose    Collection Time: 08/29/23  8:41 PM   Result Value Ref Range    POCT Glucose 147 (H) 70 - 110 mg/dL   Basic Metabolic Panel    Collection Time: 08/30/23  4:17 AM   Result Value Ref Range    Sodium Level 138 136 - 145 mmol/L    Potassium Level 4.3 3.5 - 5.1 mmol/L    Chloride 111 (H) 98 - 107 mmol/L    Carbon Dioxide 18 (L) 23 - 31 mmol/L    Glucose Level 140 (H) 82 - 115 mg/dL    Blood Urea Nitrogen 40.6 (H) 9.8 - 20.1 mg/dL    Creatinine 1.30 (H) 0.55 - 1.02 mg/dL    BUN/Creatinine Ratio 31     Calcium Level Total 8.0 (L) 8.4 - 10.2 mg/dL    Anion Gap 9.0 mEq/L    eGFR 43 mls/min/1.73/m2   Magnesium    Collection Time: 08/30/23  4:17 AM   Result Value Ref Range    Magnesium Level 2.00 1.60 - 2.60 mg/dL   CBC with Differential    Collection Time: 08/30/23  4:17 AM   Result Value Ref Range    WBC 6.94 4.50 - 11.50 x10(3)/mcL    RBC 3.68 (L) 4.20 - 5.40 x10(6)/mcL    Hgb 10.4 (L) 12.0 - 16.0 g/dL    Hct 33.6 (L) 37.0 - 47.0 %    MCV 91.3 80.0 - 94.0 fL    MCH 28.3 27.0 - 31.0 pg     MCHC 31.0 (L) 33.0 - 36.0 g/dL    RDW 14.3 11.5 - 17.0 %    Platelet 103 (L) 130 - 400 x10(3)/mcL    MPV 10.3 7.4 - 10.4 fL    Neut % 61.4 %    Lymph % 20.6 %    Mono % 14.4 %    Eos % 2.3 %    Basophil % 0.7 %    Lymph # 1.43 0.6 - 4.6 x10(3)/mcL    Neut # 4.26 2.1 - 9.2 x10(3)/mcL    Mono # 1.00 0.1 - 1.3 x10(3)/mcL    Eos # 0.16 0 - 0.9 x10(3)/mcL    Baso # 0.05 <=0.2 x10(3)/mcL    IG# 0.04 0 - 0.04 x10(3)/mcL    IG% 0.6 %    NRBC% 0.0 %     Telemetry:  AFIB    Physical Exam  Vitals and nursing note reviewed.   Constitutional:       Appearance: Normal appearance. She is obese.   HENT:      Head: Normocephalic.      Nose: Nose normal.      Mouth/Throat:      Mouth: Mucous membranes are moist.   Eyes:      Pupils: Pupils are equal, round, and reactive to light.   Cardiovascular:      Rate and Rhythm: Normal rate. Rhythm irregular.      Pulses: Normal pulses.      Heart sounds: Normal heart sounds. No murmur heard.  Pulmonary:      Effort: Pulmonary effort is normal.      Breath sounds: Normal breath sounds.   Abdominal:      General: Bowel sounds are normal.      Palpations: Abdomen is soft.   Musculoskeletal:         General: Normal range of motion.   Skin:     General: Skin is warm.      Comments: R Groin Soft/Flat with Dermabond, Non-Tender, No Sign of Bleed/Infection. +2 BLE Palpable Pedal Pulses    L Groin Painful to touch. Soft/Flat, Non-Tender, No Sign of Bleed/Infection. +2 BLE Palpable Pedal Pulses     Neurological:      Mental Status: She is alert and oriented to person, place, and time.   Psychiatric:         Mood and Affect: Mood normal.         Behavior: Behavior normal.       Current Inpatient Medications:  Current Facility-Administered Medications:     acetaminophen tablet 650 mg, 650 mg, Oral, Q4H PRN, Alok Coronado MD    apixaban tablet 5 mg, 5 mg, Oral, BID, Alok Coronado MD, 5 mg at 08/29/23 2138    atorvastatin tablet 40 mg, 40 mg, Oral, Daily, Alok Coronado MD, 40 mg at  08/29/23 0913    clopidogreL tablet 75 mg, 75 mg, Oral, Daily, Alok Coronado MD, 75 mg at 08/29/23 0914    dextrose 10% bolus 125 mL 125 mL, 12.5 g, Intravenous, PRN, Alok Coronado MD    dextrose 10% bolus 250 mL 250 mL, 25 g, Intravenous, PRN, Alok Coronado MD    glucagon (human recombinant) injection 1 mg, 1 mg, Intramuscular, PRN, Alok Coronado MD    glucose chewable tablet 16 g, 16 g, Oral, PRN, Alok Coronado MD    glucose chewable tablet 24 g, 24 g, Oral, PRN, Alok Coronado MD    HYDROcodone-acetaminophen 5-325 mg per tablet 1 tablet, 1 tablet, Oral, Q4H PRN, Alok Coronado MD, 1 tablet at 08/29/23 2138    insulin aspart U-100 injection 0-10 Units, 0-10 Units, Subcutaneous, QID (AC + HS) PRN, Alok Coronado MD, 2 Units at 08/29/23 1428    morphine injection 2 mg, 2 mg, Intravenous, Q4H PRN, Alok Coronado MD, 2 mg at 08/29/23 0209    ondansetron disintegrating tablet 8 mg, 8 mg, Oral, Q8H PRN, Alok Coronado MD    rOPINIRole tablet 1 mg, 1 mg, Oral, QHS, Alok Coronado MD, 1 mg at 08/29/23 2138    VTE Risk Mitigation (From admission, onward)           Ordered     apixaban tablet 5 mg  2 times daily         08/28/23 0855                  Assessment:   VHD  --Severe AS s/p TAVR (8.28.23): successful TAVR  with a 29  mm Evolute FX  LBBB  Fever of Unknown Origin   Native CAD  -- (7.31.23): Non-obstructive CAD  Proximal RCA  mid diffuse with focal 30-50% obstructions. Proximal LAD mild diffusion occlusion. Distal LAD ostial 30-50%   AFIB/AFLUT  --on Eliquis outpatient   HTN  HLD  Obesity  No known history of GI Bleed     Plan:   Awaiting recommendations from   Resume Losartan   Hold Metoprolol until 8.31.23  Fever Work-up per  team    2 week MCT Monitor at discharge s/t new LBBB    Labs in AM: CBC, BMP, and Mag    OCTAVIANO Hamm  Cardiology  [facility] - [facility] Medical Telemetry  08/30/2023  Physician addendum:  I have seen and  examined this patient as a split-shared visit with the BAY d/t complicated medical management of above problems written in assessment and high acuity requiring physician expertise in medical decision-making. I performed the substantive portion of the history and exam. Above medical decision-making is also formulated by me.    Cardiovascular exam:  S1, S2  Lungs:  Fine crackles at bases.  Extremities:  1+ Edema bilaterally    Plan:  Status post TAVR with the low-grade fever for the last couple of days.  Patient is afebrile since this morning.  If patient is afebrile for 24 hours we will discharge tomorrow.  Cultures currently negative.  No WBC elevation.      Rakesh Pop MD  Cardiologist

## 2023-08-31 VITALS
TEMPERATURE: 98 F | BODY MASS INDEX: 32.85 KG/M2 | RESPIRATION RATE: 18 BRPM | WEIGHT: 204.38 LBS | OXYGEN SATURATION: 97 % | HEART RATE: 83 BPM | DIASTOLIC BLOOD PRESSURE: 84 MMHG | HEIGHT: 66 IN | SYSTOLIC BLOOD PRESSURE: 143 MMHG

## 2023-08-31 PROBLEM — I35.0 AORTIC VALVE STENOSIS: Status: ACTIVE | Noted: 2023-08-31

## 2023-08-31 LAB
ABO + RH BLD: NORMAL
ABO + RH BLD: NORMAL
ANION GAP SERPL CALC-SCNC: 8 MEQ/L
BASOPHILS # BLD AUTO: 0.05 X10(3)/MCL
BASOPHILS NFR BLD AUTO: 0.7 %
BLD PROD TYP BPU: NORMAL
BLD PROD TYP BPU: NORMAL
BLOOD UNIT EXPIRATION DATE: NORMAL
BLOOD UNIT EXPIRATION DATE: NORMAL
BLOOD UNIT TYPE CODE: 5100
BLOOD UNIT TYPE CODE: 5100
BUN SERPL-MCNC: 26.1 MG/DL (ref 9.8–20.1)
CALCIUM SERPL-MCNC: 8.3 MG/DL (ref 8.4–10.2)
CHLORIDE SERPL-SCNC: 110 MMOL/L (ref 98–107)
CO2 SERPL-SCNC: 21 MMOL/L (ref 23–31)
CREAT SERPL-MCNC: 0.88 MG/DL (ref 0.55–1.02)
CREAT/UREA NIT SERPL: 30
CROSSMATCH INTERPRETATION: NORMAL
CROSSMATCH INTERPRETATION: NORMAL
DISPENSE STATUS: NORMAL
DISPENSE STATUS: NORMAL
EOSINOPHIL # BLD AUTO: 0.2 X10(3)/MCL (ref 0–0.9)
EOSINOPHIL NFR BLD AUTO: 2.9 %
ERYTHROCYTE [DISTWIDTH] IN BLOOD BY AUTOMATED COUNT: 14.1 % (ref 11.5–17)
GFR SERPLBLD CREATININE-BSD FMLA CKD-EPI: >60 MLS/MIN/1.73/M2
GLUCOSE SERPL-MCNC: 140 MG/DL (ref 82–115)
HCT VFR BLD AUTO: 36.5 % (ref 37–47)
HGB BLD-MCNC: 11.4 G/DL (ref 12–16)
IMM GRANULOCYTES # BLD AUTO: 0.04 X10(3)/MCL (ref 0–0.04)
IMM GRANULOCYTES NFR BLD AUTO: 0.6 %
LYMPHOCYTES # BLD AUTO: 1.33 X10(3)/MCL (ref 0.6–4.6)
LYMPHOCYTES NFR BLD AUTO: 19.4 %
MAGNESIUM SERPL-MCNC: 2 MG/DL (ref 1.6–2.6)
MCH RBC QN AUTO: 28.3 PG (ref 27–31)
MCHC RBC AUTO-ENTMCNC: 31.2 G/DL (ref 33–36)
MCV RBC AUTO: 90.6 FL (ref 80–94)
MONOCYTES # BLD AUTO: 0.93 X10(3)/MCL (ref 0.1–1.3)
MONOCYTES NFR BLD AUTO: 13.6 %
NEUTROPHILS # BLD AUTO: 4.31 X10(3)/MCL (ref 2.1–9.2)
NEUTROPHILS NFR BLD AUTO: 62.8 %
NRBC BLD AUTO-RTO: 0 %
PLATELET # BLD AUTO: 117 X10(3)/MCL (ref 130–400)
PMV BLD AUTO: 9.8 FL (ref 7.4–10.4)
POCT GLUCOSE: 293 MG/DL (ref 70–110)
POTASSIUM SERPL-SCNC: 4.3 MMOL/L (ref 3.5–5.1)
RBC # BLD AUTO: 4.03 X10(6)/MCL (ref 4.2–5.4)
SODIUM SERPL-SCNC: 139 MMOL/L (ref 136–145)
UNIT NUMBER: NORMAL
UNIT NUMBER: NORMAL
WBC # SPEC AUTO: 6.86 X10(3)/MCL (ref 4.5–11.5)

## 2023-08-31 PROCEDURE — 85025 COMPLETE CBC W/AUTO DIFF WBC: CPT

## 2023-08-31 PROCEDURE — 80048 BASIC METABOLIC PNL TOTAL CA: CPT

## 2023-08-31 PROCEDURE — 25000003 PHARM REV CODE 250

## 2023-08-31 PROCEDURE — 25000003 PHARM REV CODE 250: Performed by: INTERNAL MEDICINE

## 2023-08-31 PROCEDURE — 25000003 PHARM REV CODE 250: Performed by: STUDENT IN AN ORGANIZED HEALTH CARE EDUCATION/TRAINING PROGRAM

## 2023-08-31 PROCEDURE — 63600175 PHARM REV CODE 636 W HCPCS: Performed by: INTERNAL MEDICINE

## 2023-08-31 PROCEDURE — 83735 ASSAY OF MAGNESIUM: CPT

## 2023-08-31 RX ORDER — METOPROLOL SUCCINATE 50 MG/1
50 TABLET, EXTENDED RELEASE ORAL DAILY
Status: DISCONTINUED | OUTPATIENT
Start: 2023-08-31 | End: 2023-08-31 | Stop reason: HOSPADM

## 2023-08-31 RX ORDER — FUROSEMIDE 40 MG/1
40 TABLET ORAL DAILY
Status: DISCONTINUED | OUTPATIENT
Start: 2023-08-31 | End: 2023-08-31 | Stop reason: HOSPADM

## 2023-08-31 RX ADMIN — POLYETHYLENE GLYCOL 3350 17 G: 17 POWDER, FOR SOLUTION ORAL at 09:08

## 2023-08-31 RX ADMIN — APIXABAN 5 MG: 5 TABLET, FILM COATED ORAL at 09:08

## 2023-08-31 RX ADMIN — METOPROLOL SUCCINATE 50 MG: 50 TABLET, EXTENDED RELEASE ORAL at 10:08

## 2023-08-31 RX ADMIN — ATORVASTATIN CALCIUM 40 MG: 40 TABLET, FILM COATED ORAL at 09:08

## 2023-08-31 RX ADMIN — CLOPIDOGREL BISULFATE 75 MG: 75 TABLET ORAL at 09:08

## 2023-08-31 RX ADMIN — FUROSEMIDE 40 MG: 40 TABLET ORAL at 10:08

## 2023-08-31 RX ADMIN — BISACODYL 10 MG: 10 SUPPOSITORY RECTAL at 09:08

## 2023-08-31 RX ADMIN — INSULIN ASPART 6 UNITS: 100 INJECTION, SOLUTION INTRAVENOUS; SUBCUTANEOUS at 11:08

## 2023-08-31 RX ADMIN — LOSARTAN POTASSIUM 50 MG: 50 TABLET, FILM COATED ORAL at 09:08

## 2023-08-31 NOTE — DISCHARGE SUMMARY
"[facility] Medical Telemetry    Cardiology  Discharge Summary      Patient Name: Bria Lacy  MRN: [id number]  Admission Date: 8/28/2023  Hospital Length of Stay: 3 days  Discharge Date and Time:  08/31/2023 11:49 AM  Attending Physician: Alok Coronado MD  Discharging Provider: OCTAVIANO Hamm  Primary Care Physician: Bin Pratt Jr., MD    HPI:   Ms. Lacy is a 74 y/o female with a history of Native CAD, VHD s/p TAVR, AFIB/AFLUT, HTN, HLD, who is known to [facility], Dr. Figueroa/Cliff. She presented to [facility] for a scheduled TAVR on 8.28.23. She underwent a successful TAVR  with a 29  mm Evolute FX.      Hospital Course:   8.29.23: NAD. Marginal BP. Fever 100.4 last night.   8.30.23: NAD. VSS. Low-grade fever with chills. No complaints of CP/SOB/Palps  8.31.23: NAD. VSS. No fevers for 24 hours. No complaints of CP/SOB/Palps. Sitting in chair without complaints. "I'm okay. Ready to go home"     PMH: Native CAD, VHD, AFIB/AFLUT HTN, HLD, Obesity  PSH: Finger Surgery, Hysterectomy, Tonsillectomy, Appendectomy, Cholecystectomy  Family History: Father - Cancer  Social History: Denies smoking, illicit drug use, and alcohol use     Previous Cardiac Diagnostics:   Limited ECHO (8.29.23):  Aortic Valve: There is a transcatheter valve replacement in the aortic position.Peak AV 2.3 m/sec. Mitral annular calcification is present. No significant pericardial effusion or tamponade physiology.     Bilateral Venous US (8.29.23):  There was no evidence of deep or superficial vein thrombosis in the bilateral lower extremities.     CV Pseudoanerysm Eval (8.29.23):  There is no evidence of pseudoaneurysm/hematoma identified in the groin of the left lower extremity.   The posterior tibial and dorsalis pedis arteries are patent.      TAVR (8.28.23):   successful TAVR  with a 29  mm Evolute FX     Limited ECHO (8.28.23):  Limited echo during TAVR procedure.  Well seated self-expaning TAVR valve. No " significant paravalvular leak. Mean gradient 3 mmHg. No pericardial effusion.     Calcium Score (7.27.23): 2267     LHC (7.31.23):  Non-obstructive CAD  Proximal RCA  mid diffuse with focal 30-50% obstructions. Proximal LAD mild diffusion occlusion. Distal LAD ostial 30-50%      ECHO (5.18.23):  The left ventricle is decreased in size. Global left ventricular systolic function is borderline normal. The left ventricular ejection fraction is 50%. Left ventricular diastolic function is indeterminate. Noted left ventricular hypertrophy. Concentric left ventricular hypertrophy is present. It is moderate. Moderate aortic valve stenosis is present. The aortic valve area planimetry is 1.1 cm². Aortic valve area continuity equation is 1.1 cm². The trans-aortic peak velocity is 3.7 m/s. The trans-aortic peak gradient is 55.5 mmHg. The trans-aortic mean gradient is 31.2 mmHg. DI-0.56. Moderate mitral annular calcification is noted. The estimated pulmonary artery systolic pressure is 27 mmHg assuming a right atrial pressure of 8 mmHg.      CTA Chest (5.18.23):  Negative for PE     Carotid US (4.27.23):  Bilateral plaque formation in the carotid bulbs and proximal internal carotid arteries bilaterally, greater on the right. There is also elevation of peak velocity in the right internal carotid artery indicating moderate stenosis by velocity criteria.       Procedure(s) (LRB):  REPLACEMENT, AORTIC VALVE, TRANSCATHETER (TAVR) (N/A)     Consults:   Consults (From admission, onward)          Status Ordering Provider     Inpatient consult to Internal Medicine  Once        Provider:  Donavon Mckoy MD Completed EAMES, SIERRA              Final Active Diagnoses:    Diagnosis Date Noted POA    PRINCIPAL PROBLEM:  Aortic valve stenosis [I35.0] 08/31/2023 Yes      Problems Resolved During this Admission:       Discharged Condition: stable    Review of Systems   Cardiovascular:  Negative for chest pain, dyspnea on exertion, leg  swelling and palpitations.   Respiratory:  Negative for shortness of breath.    All other systems reviewed and are negative.      Physical Exam  Vitals reviewed.   Constitutional:       Appearance: Normal appearance.   HENT:      Head: Normocephalic.      Mouth/Throat:      Mouth: Mucous membranes are moist.   Eyes:      Pupils: Pupils are equal, round, and reactive to light.   Cardiovascular:      Rate and Rhythm: Normal rate. Rhythm irregular.      Pulses: Normal pulses.      Heart sounds: Normal heart sounds.   Pulmonary:      Effort: Pulmonary effort is normal.      Breath sounds: Normal breath sounds.   Abdominal:      General: Bowel sounds are normal.      Palpations: Abdomen is soft.   Musculoskeletal:         General: Normal range of motion.   Skin:     General: Skin is warm.      Comments: R Groin Soft/Flat with Dermabond, Non-Tender, No Sign of Bleed/Infection. +2 BLE Palpable Pedal Pulses     L Groin Painful to touch. Soft/Flat, Non-Tender, No Sign of Bleed/Infection. +2 BLE Palpable Pedal Pulses   Neurological:      Mental Status: She is alert and oriented to person, place, and time.   Psychiatric:         Mood and Affect: Mood normal.         Behavior: Behavior normal.         Disposition: Home or Self Care    Follow Up:   Contact information for follow-up providers       Yovani Mckoy MD Follow up on 9/6/2023.    Specialties: Cardiovascular Disease, Interventional Cardiology, Cardiology  Why: @ 11:10AM [facility] Location  Contact information:  [address] [place] [address]  [phone]               Yovani Mckoy MD Follow up on 9/1/2023.    Specialties: Cardiovascular Disease, Interventional Cardiology, Cardiology  Why: @ 10:00AM for heart monitor placement. [facility] Location  Contact information:  [address] [place] [address]  [phone]                       Contact information for after-discharge care       Home Medical Care        NURSING SPECIALTIES .    Service: Home Health Services  Contact information:  [address]  [facility] [address]  [phone]                                 Patient Instructions:      X-Ray Chest PA And Lateral   Standing Status: Future Standing Exp. Date: 08/28/24     Order Specific Question Answer Comments   [facility] ONLY: Performing/Resulting Location [facility]    Reason for Exam: Pre-op    May the Radiologist modify the order per protocol to meet the clinical needs of the patient? Yes    Release to patient Immediate      Diet Cardiac     Diet diabetic     SUBSEQUENT HOME HEALTH ORDERS   Order Comments: Arrange Home Health services; eval and treat for all disciplines   PCP:   Dr. Bin Pratt     Order Specific Question Answer Comments   What Home Health Agency is the patient currently using? Other/External      Lifting restrictions   Order Comments: Do not lift anything greater than 5-10 pounds for 3-5 days     No driving until:   Order Comments: 9.1.23     Activity as tolerated     Medications:  Reconciled Home Medications:      Medication List        CHANGE how you take these medications      metoprolol succinate 50 MG 24 hr tablet  Commonly known as: TOPROL-XL  Take 50 mg by mouth once daily.  What changed: Another medication with the same name was removed. Continue taking this medication, and follow the directions you see here.            CONTINUE taking these medications      atorvastatin 40 MG tablet  Commonly known as: LIPITOR  Take 40 mg by mouth once daily.     AYR SALINE NASAL NETI RINSE LETY  by sinus irrigation route 2 (two) times daily as needed.     azelastine 137 mcg (0.1 %) nasal spray  Commonly known as: ASTELIN  SMARTSIG:Both Nares     brimonidine 0.2% 0.2 % Drop  Commonly known as: ALPHAGAN  Place 1 drop into the left eye daily as needed (pain).     cholecalciferol (vitamin D3) 125 mcg (5,000 unit) capsule  Take 5,000 Units by mouth once daily.     cholestyramine  4 gram packet  Commonly known as: QUESTRAN  Take 4 g by mouth 3 (three) times daily with meals.     clopidogreL 75 mg tablet  Commonly known as: PLAVIX  Take 75 mg by mouth once daily.     CO Q-10 200 mg capsule  Generic drug: coenzyme Q10  Take 200 mg by mouth once daily.     dorzolamide 2 % ophthalmic solution  Commonly known as: TRUSOPT  Place 1 drop into the right eye 2 (two) times a day.     ELIQUIS 5 mg Tab  Generic drug: apixaban  Take 5 mg by mouth 2 (two) times daily.     erythromycin ophthalmic ointment  Commonly known as: ROMYCIN  Place into the left eye as needed.     fluticasone propionate 50 mcg/actuation nasal spray  Commonly known as: FLONASE  by Each Nostril route.     furosemide 40 MG tablet  Commonly known as: LASIX  Take 40 mg by mouth once daily.     gabapentin 300 MG capsule  Commonly known as: NEURONTIN  Take 300 mg by mouth 3 (three) times daily.     hydrOXYzine HCL 25 MG tablet  Commonly known as: ATARAX  Take 25 mg by mouth nightly. For itching     ILEVRO OPHT  Place 1 drop into the left eye as needed.     JARDIANCE 25 mg tablet  Generic drug: empagliflozin  Take 25 mg by mouth once daily.     LANTUS SOLOSTAR U-100 INSULIN glargine 100 units/mL SubQ pen  Generic drug: insulin  Inject 30 Units into the skin every evening.     losartan 50 MG tablet  Commonly known as: COZAAR  Take 50 mg by mouth once daily.     magnesium oxide 400 mg (241.3 mg magnesium) tablet  Commonly known as: MAG-OX  Take 400 mg by mouth once daily. Bottle-250 mg daily at bedtime     methazoLAMIDE 25 mg tablet  Commonly known as: NEPTAZANE  Take 50 mg by mouth 2 (two) times daily.     OCUVITE ORAL  Take 1 tablet by mouth 2 (two) times a day.     ONE DAILY MULTIVITAMIN per tablet  Generic drug: multivitamin  Take 1 tablet by mouth once daily.     pantoprazole 40 MG tablet  Commonly known as: PROTONIX  Take 40 mg by mouth once daily.     rOPINIRole 1 MG tablet  Commonly known as: REQUIP  Take 1 mg by mouth every evening.    "  SURE COMFORT PEN NEEDLE 31 gauge x 5/16" Ndle  Generic drug: pen needle, diabetic     SYSTANE COMPLETE OPHT  Apply 1-2 drops to eye as needed (dry eye).     VITAMIN B-6 ORAL  Take 100 mg by mouth once daily.            STOP taking these medications      azithromycin 250 MG tablet  Commonly known as: Z-JUDY     diltiaZEM 120 MG Cp24  Commonly known as: CARDIZEM CD     magnesium 250 mg Tab            Impression:  VHD  --Severe AS s/p TAVR (8.28.23): successful TAVR  with a 29  mm Evolute FX  LBBB  Fever of Unknown Origin   --Fever free for 24 hours  Native CAD  -- (7.31.23): Non-obstructive CAD  Proximal RCA  mid diffuse with focal 30-50% obstructions. Proximal LAD mild diffusion occlusion. Distal LAD ostial 30-50%   AFIB/AFLUT  --on Eliquis outpatient   HTN  HLD  Obesity  No known history of GI Bleed     Plan:   Discharge home  Continue Home Medications   2 week MCT Monitor at discharge for new LBBB  Referral to [facility] for TAVR  Follow-up with Dr. Mckoy in 1 week      Time spent on the discharge of patient: 36 minutes    Vicki Lofton, OCTAVIANO  Cardiology  [facility] Tele[facility]    Physician addendum:  I have seen and examined this patient as a split-shared visit with the BAY d/t complicated medical management of above problems written in assessment and high acuity requiring physician expertise in medical decision-making. I performed the substantive portion of the history and exam. Above medical decision-making is also formulated by me.    Pt. Would like to be discharged.   Plan:   Medications reviewed. Discharge to home. Answered all questions prior to discharge.   F/U scheduled as above.     Rakesh Pop MD  Cardiologist    "

## 2023-08-31 NOTE — PLAN OF CARE
08/31/23 0844   Discharge Reassessment   Assessment Type Discharge Planning Reassessment   Did the patient's condition or plan change since previous assessment? No   Discharge Plan discussed with: Patient  (Bolivar Lacy (Son)   [phone] (Mobile))   Communicated RENEE with patient/caregiver Date not available/Unable to determine   Discharge Plan A Home Health  (FOC: patient will continue home health services through [facility] upon discharge as she was presently active with this agency prior to this hospitalization.)   Discharge Plan B Home Health   DME Needed Upon Discharge  none   Transition of Care Barriers None   Why the patient remains in the hospital Requires continued medical care   Post-Acute Status   Post-Acute Authorization Home Health   Home Health Status Set-up Complete/Auth obtained   Coverage Payor:  HUMAN MANAGED MEDICARE - [employer] MEDICARE HMO   Hospital Resources/Appts/Education Provided Appointments scheduled and added to AVS   Patient choice form signed by patient/caregiver List with quality metrics by geographic area provided   Discharge Delays None known at this time

## 2023-08-31 NOTE — NURSING
Patient stable upon discharge. Discharge education and instructions given prior to discharge. Patient left via transport to ED.

## 2023-08-31 NOTE — PROGRESS NOTES
[facility] Hospital Medicine Progress Note        Chief Complaint: Inpatient Follow-up for     Subjective:  Bria Lacy is a 73 y.o. female with  has a past medical history of Atrial fibrillation, CHF (congestive heart failure), Depression, Diabetes mellitus, Diarrhea, Eye disease, GERD (gastroesophageal reflux disease), HLD (hyperlipidemia), Hypertension, Neuropathy, Obesity, Restless legs, Stenosis of aortic and mitral valves, TIA (transient ischemic attack), and Walker as ambulation aid.,valvular heart disease s/p TAVR; admitted to [facility] under Dr. Alok Coronado on 8/28/2023 with the diagnosis of valvular heart disease. Pt had a TAVR on 8/28/2023. PT developed fever of 100.8 F midnight this am. No reports of cough, congestion, N/V/D, abdominal pain or any urinary symptoms. Mild SOB reported. Pt did have some hypotension yesterday evening which has resolved at the time of rounds. Hospital Medicine team was consulted for workup and medical management of fever.     Seen and examined the patient she has low-grade fever 8:00 p.m. last night besides that she did not have anymore fevers.  She denies any chest pain shortness of breath cough chills nausea vomiting diarrhea abdominal pain or any rashes she also denies having sore throat  No urinary symptoms.    Objective/physical exam:  General appearance: Well-developed, well-nourished obese elderly female in no apparent distress.  HENT: Atraumatic head. Moist mucous membranes of oral cavity.  Eyes: PERRL  Lungs: diminished bilaterally. No wheezing present.   Heart: Regular rate and rhythm. S1 and S2 present cap refill brisk, trace edema  Abdomen: Soft, non-distended, non-tender. No rebound tenderness/guarding. Bowel sounds are normal.   Extremities: No cyanosis, clubbing, MINOR generalized weakness  Skin: warm and dry  Neuro: AAOx3, no focal deficits    VITAL SIGNS: 24 HRS MIN & MAX LAST   Temp  Min: 98 °F (36.7 °C)  Max: 100 °F (37.8 °C)  98.4 °F (36.9 °C)   BP  Min: 103/45  Max: 154/75 (!) 148/60   Pulse  Min: 70  Max: 128  82   Resp  Min: 16  Max: 20 20   SpO2  Min: 92 %  Max: 96 % (!) 92 %       Labs, Microbiology and Imaging were Reviewed.      Microbiology Results (last 7 days)       Procedure Component Value Units Date/Time    Blood Culture [[id number]]  (Normal) Collected: 08/29/23 0857    Order Status: Completed Specimen: Blood from Arm, Right Updated: 08/30/23 1000     CULTURE, BLOOD ([facility]) No Growth At 24 Hours               Medications   apixaban  5 mg Oral BID    atorvastatin  40 mg Oral Daily    bisacodyL  10 mg Rectal Daily    clopidogreL  75 mg Oral Daily    losartan  50 mg Oral Daily    polyethylene glycol  17 g Oral BID    rOPINIRole  1 mg Oral QHS        acetaminophen, dextrose 10%, dextrose 10%, glucagon (human recombinant), glucose, glucose, HYDROcodone-acetaminophen, insulin aspart U-100, morphine, ondansetron     Radiology:  CV Ultrasound Pseudoaneurysm Evaluation/Treatment  There is no evidence of pseudoaneurysm/hematoma identified in the groin of   the left lower extremity.   The posterior tibial and dorsalis pedis arteries are patent.           Assessment/Plan:  Acute fever- unknown etiology, suspect post-operative  Valvular heart disease- s/p TAVR 8/28/2023  Hypovolemic hypotension- suspect post-operative, resolving  MAT- POA  DM type II with hyperglycemia  PAF- controlled rate- POA  HLD- POA     All workup is negative so far,   Chest x-ray UA is negative patient does not have any abdominal symptoms check the groins and looked clean and no signs of infection.  We will monitor patient overnight, if afebrile for 24 hours okay to DC the patient.    However if patient continues to have fever we will recommend chest abdomen pelvis with contrast to rule out any underlying infectious process    All diagnosis and differential diagnosis have been reviewed; assessment and plan has been documented; I have personally reviewed the labs and  test results that are presently available; I have reviewed the patients medication list; I have reviewed the consulting providers response and recommendations. I have reviewed or attempted to review medical records based upon their availability.       Norman Melvin MD   08/30/2023

## 2023-08-31 NOTE — PLAN OF CARE
08/31/23 1413   Final Note   Assessment Type Final Discharge Note   Anticipated Discharge Disposition Home-Health   Hospital Resources/Appts/Education Provided Appointments scheduled and added to AVS   Post-Acute Status   Post-Acute Authorization Home Health  (FOC: Patient will resume (HH) services through [facility] as she was currently active with this agency prior to this discharge.)   Home Health Status Set-up Complete/Auth obtained   Coverage Payor:  [employer] MEDICARE - [employer] MEDICARE HMO   Patient choice form signed by patient/caregiver List with quality metrics by geographic area provided   Discharge Delays None known at this time     Patient will be discharged to her private residence with (HH) services to be resumed through [facility] as she was presently active prior to this hospitalization. Clinical notes/updates and AVS and D/C Summary were uploaded and sent via [employer]. The patient will be discharged to her private residence via family member's private vehicle.

## 2023-09-03 LAB — BACTERIA BLD CULT: NORMAL

## (undated) DEVICE — SHEATH INTRODUCER 6FR 11CM

## (undated) DEVICE — SYS WASTE FLD DISPOSAL 1400ML

## (undated) DEVICE — CATH ELECTRODE BLLN 5FR 110CM

## (undated) DEVICE — SHEATH PINNACLE 8FR

## (undated) DEVICE — SUT PERCLOSE PROSTYLE MEDIATE

## (undated) DEVICE — SET MICPUNC ACC STIFF CANNULA

## (undated) DEVICE — CATH IMPULSE 5F 100CM FR4

## (undated) DEVICE — ADHESIVE DERMABOND ADVANCED

## (undated) DEVICE — PAD HEARTSTART DEFIB ADULT

## (undated) DEVICE — SHEATH DESTINATION 6F X 45CM

## (undated) DEVICE — GUIDEWIRE AMPLATZ STIFF 260X7

## (undated) DEVICE — SHEATH CHECK FLO 14FR/30CM

## (undated) DEVICE — GUIDEWIRE SAFARI2 XS CRV 275CM

## (undated) DEVICE — Device

## (undated) DEVICE — CATH SWAN GANZ PACING 5FR

## (undated) DEVICE — DRESSING TEGADERM 4.4X5IN

## (undated) DEVICE — CATH AL 1.0 5/BX

## (undated) DEVICE — SET ANGIO ACIST CVI ANGIOTOUCH

## (undated) DEVICE — GUIDEWIRE INQWIRE SE 3MM JTIP

## (undated) DEVICE — GUIDEWIRE STF .035X180CM STR

## (undated) DEVICE — CATH EMPULSE ANGLED 5FR PIGTAI